# Patient Record
Sex: FEMALE | Race: WHITE | NOT HISPANIC OR LATINO
[De-identification: names, ages, dates, MRNs, and addresses within clinical notes are randomized per-mention and may not be internally consistent; named-entity substitution may affect disease eponyms.]

---

## 2017-03-17 ENCOUNTER — APPOINTMENT (OUTPATIENT)
Dept: OBGYN | Facility: CLINIC | Age: 47
End: 2017-03-17

## 2017-03-17 VITALS
WEIGHT: 143 LBS | DIASTOLIC BLOOD PRESSURE: 80 MMHG | HEIGHT: 59 IN | BODY MASS INDEX: 28.83 KG/M2 | SYSTOLIC BLOOD PRESSURE: 120 MMHG

## 2017-03-17 DIAGNOSIS — Z12.4 ENCOUNTER FOR GYNECOLOGICAL EXAMINATION (GENERAL) (ROUTINE) W/OUT ABNORMAL FINDINGS: ICD-10-CM

## 2017-03-17 DIAGNOSIS — Z01.419 ENCOUNTER FOR GYNECOLOGICAL EXAMINATION (GENERAL) (ROUTINE) W/OUT ABNORMAL FINDINGS: ICD-10-CM

## 2017-03-21 ENCOUNTER — LABORATORY RESULT (OUTPATIENT)
Age: 47
End: 2017-03-21

## 2017-03-21 LAB
C TRACH RRNA SPEC QL NAA+PROBE: NORMAL
HPV HIGH+LOW RISK DNA PNL CVX: NEGATIVE
N GONORRHOEA RRNA SPEC QL NAA+PROBE: NORMAL
SOURCE TP AMPLIFICATION: NORMAL

## 2017-03-23 ENCOUNTER — APPOINTMENT (OUTPATIENT)
Dept: MAMMOGRAPHY | Facility: CLINIC | Age: 47
End: 2017-03-23

## 2017-03-23 ENCOUNTER — FORM ENCOUNTER (OUTPATIENT)
Age: 47
End: 2017-03-23

## 2017-03-23 LAB — CYTOLOGY CVX/VAG DOC THIN PREP: NORMAL

## 2017-03-24 ENCOUNTER — APPOINTMENT (OUTPATIENT)
Dept: MAMMOGRAPHY | Facility: CLINIC | Age: 47
End: 2017-03-24

## 2017-03-24 ENCOUNTER — OUTPATIENT (OUTPATIENT)
Dept: OUTPATIENT SERVICES | Facility: HOSPITAL | Age: 47
LOS: 1 days | End: 2017-03-24
Payer: COMMERCIAL

## 2017-03-24 DIAGNOSIS — Z00.8 ENCOUNTER FOR OTHER GENERAL EXAMINATION: ICD-10-CM

## 2017-03-24 PROCEDURE — 77063 BREAST TOMOSYNTHESIS BI: CPT

## 2017-03-24 PROCEDURE — 77067 SCR MAMMO BI INCL CAD: CPT

## 2017-03-31 ENCOUNTER — APPOINTMENT (OUTPATIENT)
Dept: OBGYN | Facility: CLINIC | Age: 47
End: 2017-03-31

## 2017-05-03 ENCOUNTER — APPOINTMENT (OUTPATIENT)
Dept: OBGYN | Facility: CLINIC | Age: 47
End: 2017-05-03

## 2017-05-03 VITALS
BODY MASS INDEX: 27.42 KG/M2 | HEART RATE: 97 BPM | DIASTOLIC BLOOD PRESSURE: 86 MMHG | HEIGHT: 59 IN | WEIGHT: 136 LBS | SYSTOLIC BLOOD PRESSURE: 133 MMHG

## 2017-05-03 LAB
HCG UR QL: NEGATIVE
QUALITY CONTROL: YES

## 2017-05-10 LAB — CORE LAB BIOPSY: NORMAL

## 2017-05-15 ENCOUNTER — APPOINTMENT (OUTPATIENT)
Dept: OBGYN | Facility: CLINIC | Age: 47
End: 2017-05-15

## 2017-05-15 VITALS
HEIGHT: 59 IN | DIASTOLIC BLOOD PRESSURE: 77 MMHG | SYSTOLIC BLOOD PRESSURE: 125 MMHG | BODY MASS INDEX: 27.42 KG/M2 | WEIGHT: 136 LBS

## 2017-12-11 ENCOUNTER — APPOINTMENT (OUTPATIENT)
Dept: OBGYN | Facility: CLINIC | Age: 47
End: 2017-12-11
Payer: COMMERCIAL

## 2017-12-11 VITALS
SYSTOLIC BLOOD PRESSURE: 122 MMHG | BODY MASS INDEX: 30.64 KG/M2 | DIASTOLIC BLOOD PRESSURE: 78 MMHG | WEIGHT: 152 LBS | HEIGHT: 59 IN

## 2017-12-11 DIAGNOSIS — Z86.19 PERSONAL HISTORY OF OTHER INFECTIOUS AND PARASITIC DISEASES: ICD-10-CM

## 2017-12-11 DIAGNOSIS — Z86.59 PERSONAL HISTORY OF OTHER MENTAL AND BEHAVIORAL DISORDERS: ICD-10-CM

## 2017-12-11 DIAGNOSIS — Z86.79 PERSONAL HISTORY OF OTHER DISEASES OF THE CIRCULATORY SYSTEM: ICD-10-CM

## 2017-12-11 PROCEDURE — 99214 OFFICE O/P EST MOD 30 MIN: CPT

## 2017-12-11 RX ORDER — BUPROPION HYDROCHLORIDE 150 MG/1
150 TABLET, EXTENDED RELEASE ORAL
Qty: 30 | Refills: 0 | Status: COMPLETED | COMMUNITY
Start: 2017-02-14

## 2017-12-11 RX ORDER — VALSARTAN AND HYDROCHLOROTHIAZIDE 80; 12.5 MG/1; MG/1
80-12.5 TABLET, FILM COATED ORAL
Qty: 90 | Refills: 0 | Status: ACTIVE | COMMUNITY
Start: 2016-08-22

## 2017-12-11 RX ORDER — CLOTRIMAZOLE AND BETAMETHASONE DIPROPIONATE 10; .5 MG/G; MG/G
1-0.05 CREAM TOPICAL TWICE DAILY
Qty: 1 | Refills: 2 | Status: ACTIVE | COMMUNITY
Start: 2017-12-11 | End: 1900-01-01

## 2017-12-11 RX ORDER — METOPROLOL SUCCINATE 25 MG/1
25 TABLET, EXTENDED RELEASE ORAL
Qty: 90 | Refills: 0 | Status: ACTIVE | COMMUNITY
Start: 2016-10-06

## 2017-12-11 RX ORDER — FLUTICASONE PROPIONATE 50 UG/1
50 SPRAY, METERED NASAL
Qty: 16 | Refills: 0 | Status: COMPLETED | COMMUNITY
Start: 2016-12-13

## 2017-12-11 RX ORDER — KETOCONAZOLE 20 MG/G
2 CREAM TOPICAL
Qty: 60 | Refills: 0 | Status: COMPLETED | COMMUNITY
Start: 2017-03-06

## 2017-12-11 RX ORDER — FLUCONAZOLE 150 MG/1
150 TABLET ORAL
Qty: 2 | Refills: 2 | Status: ACTIVE | COMMUNITY
Start: 2017-12-11 | End: 1900-01-01

## 2017-12-11 RX ORDER — BUPROPION HYDROCHLORIDE 300 MG/1
300 TABLET, EXTENDED RELEASE ORAL
Refills: 0 | Status: ACTIVE | COMMUNITY

## 2017-12-11 RX ORDER — OMEPRAZOLE 20 MG/1
20 CAPSULE, DELAYED RELEASE ORAL
Qty: 30 | Refills: 0 | Status: COMPLETED | COMMUNITY
Start: 2017-01-03

## 2017-12-11 RX ORDER — ARIPIPRAZOLE 5 MG/1
5 TABLET ORAL
Qty: 30 | Refills: 0 | Status: COMPLETED | COMMUNITY
Start: 2017-03-03

## 2017-12-11 RX ORDER — AMOXICILLIN AND CLAVULANATE POTASSIUM 875; 125 MG/1; MG/1
875-125 TABLET, COATED ORAL
Qty: 20 | Refills: 0 | Status: COMPLETED | COMMUNITY
Start: 2016-12-13

## 2017-12-14 LAB — BACTERIA GENITAL AEROBE CULT: ABNORMAL

## 2020-12-15 PROBLEM — Z86.19 HISTORY OF CANDIDIASIS OF VAGINA: Status: RESOLVED | Noted: 2017-12-11 | Resolved: 2020-12-15

## 2020-12-15 PROBLEM — Z01.419 ENCOUNTER FOR GYNECOLOGICAL EXAMINATION WITH PAPANICOLAOU SMEAR OF CERVIX: Status: RESOLVED | Noted: 2017-03-17 | Resolved: 2020-12-15

## 2022-04-21 ENCOUNTER — OFFICE VISIT (OUTPATIENT)
Dept: FAMILY MEDICINE CLINIC | Facility: CLINIC | Age: 52
End: 2022-04-21
Payer: COMMERCIAL

## 2022-04-21 VITALS
DIASTOLIC BLOOD PRESSURE: 83 MMHG | HEART RATE: 83 BPM | WEIGHT: 165 LBS | HEIGHT: 60 IN | BODY MASS INDEX: 32.39 KG/M2 | SYSTOLIC BLOOD PRESSURE: 122 MMHG

## 2022-04-21 DIAGNOSIS — R63.5 WEIGHT GAIN: ICD-10-CM

## 2022-04-21 DIAGNOSIS — K92.1 BLOOD IN STOOL: Primary | ICD-10-CM

## 2022-04-21 PROCEDURE — 99203 OFFICE O/P NEW LOW 30 MIN: CPT | Performed by: PHYSICIAN ASSISTANT

## 2022-04-21 PROCEDURE — 82272 OCCULT BLD FECES 1-3 TESTS: CPT | Performed by: PHYSICIAN ASSISTANT

## 2022-04-21 PROCEDURE — 3074F SYST BP LT 130 MM HG: CPT | Performed by: PHYSICIAN ASSISTANT

## 2022-04-21 PROCEDURE — 3079F DIAST BP 80-89 MM HG: CPT | Performed by: PHYSICIAN ASSISTANT

## 2022-04-21 PROCEDURE — 3008F BODY MASS INDEX DOCD: CPT | Performed by: PHYSICIAN ASSISTANT

## 2022-04-21 RX ORDER — LOSARTAN POTASSIUM AND HYDROCHLOROTHIAZIDE 12.5; 5 MG/1; MG/1
1 TABLET ORAL DAILY
COMMUNITY
Start: 2020-05-04

## 2022-04-21 RX ORDER — ACAMPROSATE CALCIUM 333 MG/1
2 TABLET, DELAYED RELEASE ORAL AS DIRECTED
COMMUNITY
End: 2022-04-23 | Stop reason: ALTCHOICE

## 2022-04-21 RX ORDER — ERGOCALCIFEROL (VITAMIN D2) 1250 MCG
1.25 CAPSULE ORAL WEEKLY
COMMUNITY
Start: 2019-12-02 | End: 2022-04-23 | Stop reason: ALTCHOICE

## 2022-04-21 RX ORDER — ASCORBIC ACID 500 MG
500 TABLET ORAL
COMMUNITY
End: 2022-04-23 | Stop reason: ALTCHOICE

## 2022-04-22 LAB
OCCULT BLOOD, STOOL 1: POSITIVE
PERFORMANCE MONITORS CORRECT (YES/NO): YES YES/NO
TEST CARD LOT #: NORMAL NUMERIC

## 2022-04-23 ENCOUNTER — LAB ENCOUNTER (OUTPATIENT)
Dept: LAB | Age: 52
End: 2022-04-23
Attending: PHYSICIAN ASSISTANT
Payer: COMMERCIAL

## 2022-04-23 ENCOUNTER — OFFICE VISIT (OUTPATIENT)
Dept: FAMILY MEDICINE CLINIC | Facility: CLINIC | Age: 52
End: 2022-04-23
Payer: COMMERCIAL

## 2022-04-23 VITALS
HEART RATE: 83 BPM | SYSTOLIC BLOOD PRESSURE: 124 MMHG | HEIGHT: 60 IN | WEIGHT: 162 LBS | DIASTOLIC BLOOD PRESSURE: 88 MMHG | BODY MASS INDEX: 31.8 KG/M2

## 2022-04-23 DIAGNOSIS — Z00.00 ROUTINE GENERAL MEDICAL EXAMINATION AT A HEALTH CARE FACILITY: ICD-10-CM

## 2022-04-23 DIAGNOSIS — K92.1 BLOOD IN STOOL: ICD-10-CM

## 2022-04-23 DIAGNOSIS — E55.9 VITAMIN D DEFICIENCY: ICD-10-CM

## 2022-04-23 DIAGNOSIS — Z98.84 HISTORY OF GASTRIC BYPASS: ICD-10-CM

## 2022-04-23 DIAGNOSIS — Z00.00 ROUTINE GENERAL MEDICAL EXAMINATION AT A HEALTH CARE FACILITY: Primary | ICD-10-CM

## 2022-04-23 LAB
ALBUMIN SERPL-MCNC: 3.8 G/DL (ref 3.4–5)
ALBUMIN/GLOB SERPL: 1.2 {RATIO} (ref 1–2)
ALP LIVER SERPL-CCNC: 90 U/L
ALT SERPL-CCNC: 32 U/L
ANION GAP SERPL CALC-SCNC: 6 MMOL/L (ref 0–18)
AST SERPL-CCNC: 29 U/L (ref 15–37)
BASOPHILS # BLD AUTO: 0.05 X10(3) UL (ref 0–0.2)
BASOPHILS NFR BLD AUTO: 1 %
BILIRUB SERPL-MCNC: 0.4 MG/DL (ref 0.1–2)
BUN BLD-MCNC: 21 MG/DL (ref 7–18)
BUN/CREAT SERPL: 28.8 (ref 10–20)
CALCIUM BLD-MCNC: 9.7 MG/DL (ref 8.5–10.1)
CHLORIDE SERPL-SCNC: 102 MMOL/L (ref 98–112)
CHOLEST SERPL-MCNC: 258 MG/DL (ref ?–200)
CO2 SERPL-SCNC: 29 MMOL/L (ref 21–32)
CREAT BLD-MCNC: 0.73 MG/DL
DEPRECATED HBV CORE AB SER IA-ACNC: 29.2 NG/ML
DEPRECATED RDW RBC AUTO: 41.7 FL (ref 35.1–46.3)
EOSINOPHIL # BLD AUTO: 0.18 X10(3) UL (ref 0–0.7)
EOSINOPHIL NFR BLD AUTO: 3.5 %
ERYTHROCYTE [DISTWIDTH] IN BLOOD BY AUTOMATED COUNT: 13 % (ref 11–15)
EST. AVERAGE GLUCOSE BLD GHB EST-MCNC: 117 MG/DL (ref 68–126)
FASTING PATIENT LIPID ANSWER: YES
FASTING STATUS PATIENT QL REPORTED: YES
GLOBULIN PLAS-MCNC: 3.2 G/DL (ref 2.8–4.4)
GLUCOSE BLD-MCNC: 78 MG/DL (ref 70–99)
HBA1C MFR BLD: 5.7 % (ref ?–5.7)
HCT VFR BLD AUTO: 42.7 %
HDLC SERPL-MCNC: 88 MG/DL (ref 40–59)
HGB BLD-MCNC: 13.9 G/DL
IMM GRANULOCYTES # BLD AUTO: 0.01 X10(3) UL (ref 0–1)
IMM GRANULOCYTES NFR BLD: 0.2 %
IRON SATN MFR SERPL: 34 %
IRON SERPL-MCNC: 153 UG/DL
LDLC SERPL CALC-MCNC: 153 MG/DL (ref ?–100)
LYMPHOCYTES # BLD AUTO: 1.78 X10(3) UL (ref 1–4)
LYMPHOCYTES NFR BLD AUTO: 34.7 %
MCH RBC QN AUTO: 28.5 PG (ref 26–34)
MCHC RBC AUTO-ENTMCNC: 32.6 G/DL (ref 31–37)
MCV RBC AUTO: 87.7 FL
MONOCYTES # BLD AUTO: 0.36 X10(3) UL (ref 0.1–1)
MONOCYTES NFR BLD AUTO: 7 %
NEUTROPHILS # BLD AUTO: 2.75 X10 (3) UL (ref 1.5–7.7)
NEUTROPHILS # BLD AUTO: 2.75 X10(3) UL (ref 1.5–7.7)
NEUTROPHILS NFR BLD AUTO: 53.6 %
NONHDLC SERPL-MCNC: 170 MG/DL (ref ?–130)
OSMOLALITY SERPL CALC.SUM OF ELEC: 286 MOSM/KG (ref 275–295)
PLATELET # BLD AUTO: 311 10(3)UL (ref 150–450)
POTASSIUM SERPL-SCNC: 4.4 MMOL/L (ref 3.5–5.1)
PROT SERPL-MCNC: 7 G/DL (ref 6.4–8.2)
RBC # BLD AUTO: 4.87 X10(6)UL
SODIUM SERPL-SCNC: 137 MMOL/L (ref 136–145)
TIBC SERPL-MCNC: 446 UG/DL (ref 240–450)
TRANSFERRIN SERPL-MCNC: 299 MG/DL (ref 200–360)
TRIGL SERPL-MCNC: 101 MG/DL (ref 30–149)
TSI SER-ACNC: 0.72 MIU/ML (ref 0.36–3.74)
VIT B12 SERPL-MCNC: 992 PG/ML (ref 193–986)
VIT D+METAB SERPL-MCNC: 42.7 NG/ML (ref 30–100)
VLDLC SERPL CALC-MCNC: 19 MG/DL (ref 0–30)
WBC # BLD AUTO: 5.1 X10(3) UL (ref 4–11)

## 2022-04-23 PROCEDURE — 99396 PREV VISIT EST AGE 40-64: CPT | Performed by: PHYSICIAN ASSISTANT

## 2022-04-23 PROCEDURE — 90750 HZV VACC RECOMBINANT IM: CPT | Performed by: PHYSICIAN ASSISTANT

## 2022-04-23 PROCEDURE — 36415 COLL VENOUS BLD VENIPUNCTURE: CPT

## 2022-04-23 PROCEDURE — 90472 IMMUNIZATION ADMIN EACH ADD: CPT | Performed by: PHYSICIAN ASSISTANT

## 2022-04-23 PROCEDURE — 82607 VITAMIN B-12: CPT

## 2022-04-23 PROCEDURE — 80053 COMPREHEN METABOLIC PANEL: CPT

## 2022-04-23 PROCEDURE — 3074F SYST BP LT 130 MM HG: CPT | Performed by: PHYSICIAN ASSISTANT

## 2022-04-23 PROCEDURE — 84466 ASSAY OF TRANSFERRIN: CPT

## 2022-04-23 PROCEDURE — 80061 LIPID PANEL: CPT

## 2022-04-23 PROCEDURE — 3079F DIAST BP 80-89 MM HG: CPT | Performed by: PHYSICIAN ASSISTANT

## 2022-04-23 PROCEDURE — 82306 VITAMIN D 25 HYDROXY: CPT

## 2022-04-23 PROCEDURE — 82728 ASSAY OF FERRITIN: CPT

## 2022-04-23 PROCEDURE — 90732 PPSV23 VACC 2 YRS+ SUBQ/IM: CPT | Performed by: PHYSICIAN ASSISTANT

## 2022-04-23 PROCEDURE — 3008F BODY MASS INDEX DOCD: CPT | Performed by: PHYSICIAN ASSISTANT

## 2022-04-23 PROCEDURE — 90471 IMMUNIZATION ADMIN: CPT | Performed by: PHYSICIAN ASSISTANT

## 2022-04-23 PROCEDURE — 83540 ASSAY OF IRON: CPT

## 2022-04-23 PROCEDURE — 85025 COMPLETE CBC W/AUTO DIFF WBC: CPT

## 2022-04-23 PROCEDURE — 83036 HEMOGLOBIN GLYCOSYLATED A1C: CPT

## 2022-04-23 PROCEDURE — 84443 ASSAY THYROID STIM HORMONE: CPT

## 2022-04-27 ENCOUNTER — TELEPHONE (OUTPATIENT)
Dept: GASTROENTEROLOGY | Facility: CLINIC | Age: 52
End: 2022-04-27

## 2022-04-27 ENCOUNTER — LAB ENCOUNTER (OUTPATIENT)
Dept: LAB | Age: 52
End: 2022-04-27
Attending: NURSE PRACTITIONER
Payer: COMMERCIAL

## 2022-04-27 ENCOUNTER — OFFICE VISIT (OUTPATIENT)
Dept: GASTROENTEROLOGY | Facility: CLINIC | Age: 52
End: 2022-04-27
Payer: COMMERCIAL

## 2022-04-27 VITALS
SYSTOLIC BLOOD PRESSURE: 119 MMHG | WEIGHT: 162 LBS | HEIGHT: 60 IN | HEART RATE: 76 BPM | DIASTOLIC BLOOD PRESSURE: 84 MMHG | BODY MASS INDEX: 31.8 KG/M2

## 2022-04-27 DIAGNOSIS — K62.5 BRBPR (BRIGHT RED BLOOD PER RECTUM): Primary | ICD-10-CM

## 2022-04-27 DIAGNOSIS — K62.5 BRBPR (BRIGHT RED BLOOD PER RECTUM): ICD-10-CM

## 2022-04-27 DIAGNOSIS — Z83.79 FAMILY HISTORY OF CROHN'S DISEASE: ICD-10-CM

## 2022-04-27 LAB — CRP SERPL-MCNC: 0.46 MG/DL (ref ?–0.3)

## 2022-04-27 PROCEDURE — 86140 C-REACTIVE PROTEIN: CPT

## 2022-04-27 PROCEDURE — 99244 OFF/OP CNSLTJ NEW/EST MOD 40: CPT | Performed by: NURSE PRACTITIONER

## 2022-04-27 PROCEDURE — 3008F BODY MASS INDEX DOCD: CPT | Performed by: NURSE PRACTITIONER

## 2022-04-27 PROCEDURE — 36415 COLL VENOUS BLD VENIPUNCTURE: CPT

## 2022-04-27 PROCEDURE — 3074F SYST BP LT 130 MM HG: CPT | Performed by: NURSE PRACTITIONER

## 2022-04-27 PROCEDURE — 3079F DIAST BP 80-89 MM HG: CPT | Performed by: NURSE PRACTITIONER

## 2022-04-27 RX ORDER — LOSARTAN POTASSIUM 50 MG/1
TABLET ORAL DAILY
COMMUNITY

## 2022-04-27 RX ORDER — POLYETHYLENE GLYCOL 3350, SODIUM CHLORIDE, SODIUM BICARBONATE, POTASSIUM CHLORIDE 420; 11.2; 5.72; 1.48 G/4L; G/4L; G/4L; G/4L
4000 POWDER, FOR SOLUTION ORAL ONCE
Qty: 4000 ML | Refills: 0 | Status: SHIPPED | OUTPATIENT
Start: 2022-04-27 | End: 2022-04-27

## 2022-04-27 NOTE — TELEPHONE ENCOUNTER
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     Thank you

## 2022-04-27 NOTE — TELEPHONE ENCOUNTER
Patient requesting bowel prep be sent to pharmacy. Orders pended below, please sign if appropriate, thank you.

## 2022-04-27 NOTE — PATIENT INSTRUCTIONS
-labs   -er if condition decline  1. Schedule colonoscopy with SHANNAN w/ nat Trammell, or di [Diagnosis: brbpr, fhx crohns]    2.  bowel prep from pharmacy (split trilyte)    3. Hold losartan am of procedure    4. Read all bowel prep instructions carefully    5. AVOID seeds, nuts, popcorn, raw fruits and vegetables (cooked is okay) for 2-3 days before procedure    6. You will need to be tested for COVID within 72 hours of your procedure. You will be contacted with instructions on how to do this.       >>>Please note: if you were prescribed Suprep for the bowel prep and it is too expensive or not covered by insurance, it is okay to substitute Trilyte (or any similar generic prep). This can be done by notifying the pharmacy or calling our office.

## 2022-04-27 NOTE — TELEPHONE ENCOUNTER
Pt called in to get the bowel prep medication for upcoming procedure.  Pt is currently at the pharmacy please send as soon as possible thank you and please call pt once medication has been sent

## 2022-04-27 NOTE — TELEPHONE ENCOUNTER
Scheduled for:  Colonoscopy [address]  Provider Name: Dr Ata Turpin  Date: Elias Rogelio to Sat 5/21/2022  Location: [facility]  Sedation: MAC   Time:  8 am   Prep: split trilyte   Meds/Allergies Reconciled?:  NKDA  Diagnosis with codes: BRBPR K62.5, Fhx Crohn's Z83.79  Was patient informed to call insurance with codes (Y/N): Yes   Referral sent?:  Yes  [facility] or [facility]  notified?:  I sent an electronic request to Endo Scheduling and received a confirmation today. Medication Orders: Pt is aware to NOT take iron pills, herbal meds and diet supplements for 7 days before exam. Also to NOT take any form of alcohol, recreational drugs and any forms of ED meds 24 hours before exam.      Misc Orders:  Patient was informed that they will need a COVID 19 test prior to their procedure. Patient verbally understood & will await a phone call from [facility] to schedule. Further instructions given by staff:    Instructions given and pt verbalized understanding

## 2022-04-27 NOTE — TELEPHONE ENCOUNTER
I called  Patient and was able to move her procedure to an earlier date. Patient agreed to [address] [place] [address] [address] instructions    Scheduled for:  Colonoscopy [address]  Provider Name: Dr Kirstie Mcleod  Date: Azam Burr to Sat 4/30/2022      From: 5/21/2022  Location: [facility]  Sedation: MAC   Time: Moved to 10 am                    From: 8 am   Prep: split trilyte   Meds/Allergies Reconciled?:  NKDA  Diagnosis with codes: BRBPR K62.5, Fhx Crohn's Z83.79  Was patient informed to call insurance with codes (Y/N): Yes   Referral sent?:  Yes  [facility] or [address] [place] [address] notified?:  I sent an electronic change case request to Endo Scheduling and received a confirmation today.

## 2022-04-28 RX ORDER — SODIUM CHLORIDE, SODIUM LACTATE, POTASSIUM CHLORIDE, CALCIUM CHLORIDE 600; 310; 30; 20 MG/100ML; MG/100ML; MG/100ML; MG/100ML
INJECTION, SOLUTION INTRAVENOUS CONTINUOUS
OUTPATIENT
Start: 2022-04-28

## 2022-04-30 ENCOUNTER — HOSPITAL ENCOUNTER (OUTPATIENT)
Facility: HOSPITAL | Age: 52
Setting detail: HOSPITAL OUTPATIENT SURGERY
Discharge: HOME | End: 2022-04-30
Attending: INTERNAL MEDICINE | Admitting: INTERNAL MEDICINE
Payer: COMMERCIAL

## 2022-04-30 ENCOUNTER — ANESTHESIA EVENT (OUTPATIENT)
Dept: ENDOSCOPY | Facility: HOSPITAL | Age: 52
End: 2022-04-30
Payer: COMMERCIAL

## 2022-04-30 ENCOUNTER — ANESTHESIA (OUTPATIENT)
Dept: ENDOSCOPY | Facility: HOSPITAL | Age: 52
End: 2022-04-30
Payer: COMMERCIAL

## 2022-04-30 VITALS
TEMPERATURE: 98 F | HEART RATE: 59 BPM | OXYGEN SATURATION: 98 % | DIASTOLIC BLOOD PRESSURE: 71 MMHG | BODY MASS INDEX: 31.41 KG/M2 | WEIGHT: 160 LBS | RESPIRATION RATE: 17 BRPM | HEIGHT: 60 IN | SYSTOLIC BLOOD PRESSURE: 98 MMHG

## 2022-04-30 DIAGNOSIS — Z83.79 FAMILY HISTORY OF CROHN'S DISEASE: ICD-10-CM

## 2022-04-30 DIAGNOSIS — K62.5 BRIGHT RED BLOOD PER RECTUM: ICD-10-CM

## 2022-04-30 PROCEDURE — 0DBB8ZX EXCISION OF ILEUM, VIA NATURAL OR ARTIFICIAL OPENING ENDOSCOPIC, DIAGNOSTIC: ICD-10-PCS | Performed by: INTERNAL MEDICINE

## 2022-04-30 PROCEDURE — 45378 DIAGNOSTIC COLONOSCOPY: CPT | Performed by: INTERNAL MEDICINE

## 2022-04-30 PROCEDURE — 0DBN8ZX EXCISION OF SIGMOID COLON, VIA NATURAL OR ARTIFICIAL OPENING ENDOSCOPIC, DIAGNOSTIC: ICD-10-PCS | Performed by: INTERNAL MEDICINE

## 2022-04-30 RX ORDER — SODIUM CHLORIDE, SODIUM LACTATE, POTASSIUM CHLORIDE, CALCIUM CHLORIDE 600; 310; 30; 20 MG/100ML; MG/100ML; MG/100ML; MG/100ML
INJECTION, SOLUTION INTRAVENOUS CONTINUOUS
OUTPATIENT
Start: 2022-04-30

## 2022-04-30 RX ORDER — NALOXONE HYDROCHLORIDE 0.4 MG/ML
80 INJECTION, SOLUTION INTRAMUSCULAR; INTRAVENOUS; SUBCUTANEOUS AS NEEDED
OUTPATIENT
Start: 2022-04-30 | End: 2022-04-30

## 2022-04-30 RX ORDER — LIDOCAINE HYDROCHLORIDE 10 MG/ML
INJECTION, SOLUTION EPIDURAL; INFILTRATION; INTRACAUDAL; PERINEURAL AS NEEDED
Status: DISCONTINUED | OUTPATIENT
Start: 2022-04-30 | End: 2022-04-30 | Stop reason: SURG

## 2022-04-30 RX ORDER — SODIUM CHLORIDE, SODIUM LACTATE, POTASSIUM CHLORIDE, CALCIUM CHLORIDE 600; 310; 30; 20 MG/100ML; MG/100ML; MG/100ML; MG/100ML
INJECTION, SOLUTION INTRAVENOUS CONTINUOUS PRN
Status: DISCONTINUED | OUTPATIENT
Start: 2022-04-30 | End: 2022-04-30 | Stop reason: SURG

## 2022-04-30 RX ADMIN — LIDOCAINE HYDROCHLORIDE 50 MG: 10 INJECTION, SOLUTION EPIDURAL; INFILTRATION; INTRACAUDAL; PERINEURAL at 09:48:00

## 2022-04-30 RX ADMIN — SODIUM CHLORIDE, SODIUM LACTATE, POTASSIUM CHLORIDE, CALCIUM CHLORIDE: 600; 310; 30; 20 INJECTION, SOLUTION INTRAVENOUS at 09:46:00

## 2022-05-09 ENCOUNTER — TELEPHONE (OUTPATIENT)
Dept: GASTROENTEROLOGY | Facility: CLINIC | Age: 52
End: 2022-05-09

## 2022-05-09 NOTE — TELEPHONE ENCOUNTER
Left VM for patient re: CLN path: neg for microscopic colitis. Likely prep related changes.       GI staff: please place recall in for colonoscopy in 10 years

## 2022-05-19 ENCOUNTER — OFFICE VISIT (OUTPATIENT)
Dept: OBGYN CLINIC | Facility: CLINIC | Age: 52
End: 2022-05-19
Payer: COMMERCIAL

## 2022-05-19 VITALS
WEIGHT: 158 LBS | BODY MASS INDEX: 32.28 KG/M2 | DIASTOLIC BLOOD PRESSURE: 78 MMHG | HEART RATE: 73 BPM | HEIGHT: 58.5 IN | SYSTOLIC BLOOD PRESSURE: 112 MMHG

## 2022-05-19 DIAGNOSIS — Z12.31 SCREENING MAMMOGRAM, ENCOUNTER FOR: ICD-10-CM

## 2022-05-19 DIAGNOSIS — Z01.419 WELL WOMAN EXAM: Primary | ICD-10-CM

## 2022-05-19 DIAGNOSIS — Z12.4 CERVICAL CANCER SCREENING: ICD-10-CM

## 2022-05-19 PROCEDURE — 99386 PREV VISIT NEW AGE 40-64: CPT | Performed by: OBSTETRICS & GYNECOLOGY

## 2022-05-19 PROCEDURE — 3078F DIAST BP <80 MM HG: CPT | Performed by: OBSTETRICS & GYNECOLOGY

## 2022-05-19 PROCEDURE — 3074F SYST BP LT 130 MM HG: CPT | Performed by: OBSTETRICS & GYNECOLOGY

## 2022-05-19 PROCEDURE — 3008F BODY MASS INDEX DOCD: CPT | Performed by: OBSTETRICS & GYNECOLOGY

## 2022-05-20 LAB — HPV I/H RISK 1 DNA SPEC QL NAA+PROBE: POSITIVE

## 2022-05-27 LAB
HPV16 DNA CVX QL PROBE+SIG AMP: POSITIVE
HPV18 DNA CVX QL PROBE+SIG AMP: NEGATIVE

## 2022-06-01 ENCOUNTER — TELEPHONE (OUTPATIENT)
Dept: ORTHOPEDICS CLINIC | Facility: CLINIC | Age: 52
End: 2022-06-01

## 2022-06-01 DIAGNOSIS — M25.552 LEFT HIP PAIN: Primary | ICD-10-CM

## 2022-06-01 NOTE — TELEPHONE ENCOUNTER
Patient coming in for Pain in left hip radiating down to mid shin. Patient has not had any imaging done. If imaging is required please place Rx. Thank you.     Future Appointments   Date Time Provider Vinny Mckay   6/2/2022  2:20 PM Aiden Petit EM[facility] [id number]   7/7/2022  9:20 AM STAR Love [address]   7/7/2022 11:00 AM Lanell Osler., DPM [facility] [place]   7/7/2022  4:00 PM EC LMB 2ND FLR RN FM ECLMBFM [facility] [place]   9/28/2022  4:00 PM Syl Starks MD [facility]

## 2022-06-02 ENCOUNTER — HOSPITAL ENCOUNTER (OUTPATIENT)
Dept: GENERAL RADIOLOGY | Age: 52
Discharge: HOME | End: 2022-06-02
Attending: PHYSICIAN ASSISTANT
Payer: COMMERCIAL

## 2022-06-02 ENCOUNTER — TELEPHONE (OUTPATIENT)
Dept: OBGYN CLINIC | Facility: CLINIC | Age: 52
End: 2022-06-02

## 2022-06-02 ENCOUNTER — OFFICE VISIT (OUTPATIENT)
Dept: ORTHOPEDICS CLINIC | Facility: CLINIC | Age: 52
End: 2022-06-02
Payer: COMMERCIAL

## 2022-06-02 VITALS — HEART RATE: 79 BPM | OXYGEN SATURATION: 98 %

## 2022-06-02 DIAGNOSIS — M76.899 HAMSTRING TENDINITIS: ICD-10-CM

## 2022-06-02 DIAGNOSIS — M25.852 HIP IMPINGEMENT SYNDROME, LEFT: Primary | ICD-10-CM

## 2022-06-02 DIAGNOSIS — M25.552 LEFT HIP PAIN: ICD-10-CM

## 2022-06-02 DIAGNOSIS — M24.552 LEFT HIP FLEXOR TIGHTNESS: ICD-10-CM

## 2022-06-02 PROCEDURE — 99213 OFFICE O/P EST LOW 20 MIN: CPT | Performed by: PHYSICIAN ASSISTANT

## 2022-06-02 PROCEDURE — 73502 X-RAY EXAM HIP UNI 2-3 VIEWS: CPT | Performed by: PHYSICIAN ASSISTANT

## 2022-06-02 RX ORDER — NAPROXEN 500 MG/1
500 TABLET ORAL
Qty: 1 TABLET | Refills: 0 | Status: SHIPPED | OUTPATIENT
Start: 2022-06-02 | End: 2022-06-02

## 2022-06-02 NOTE — TELEPHONE ENCOUNTER
Pt informed of results and recs and verbalized understanding. Assist pt with scheduling colpo on 6/15. Pt is postmenopausal, hcg is not needed.

## 2022-06-06 DIAGNOSIS — M25.852 HIP IMPINGEMENT SYNDROME, LEFT: Primary | ICD-10-CM

## 2022-06-06 RX ORDER — NAPROXEN 500 MG/1
500 TABLET ORAL 2 TIMES DAILY WITH MEALS
Qty: 60 TABLET | Refills: 1 | Status: SHIPPED | OUTPATIENT
Start: 2022-06-06

## 2022-06-11 ENCOUNTER — PATIENT MESSAGE (OUTPATIENT)
Dept: FAMILY MEDICINE CLINIC | Facility: CLINIC | Age: 52
End: 2022-06-11

## 2022-06-11 ENCOUNTER — HOSPITAL ENCOUNTER (OUTPATIENT)
Dept: MAMMOGRAPHY | Facility: HOSPITAL | Age: 52
Discharge: HOME | End: 2022-06-11
Attending: OBSTETRICS & GYNECOLOGY
Payer: COMMERCIAL

## 2022-06-11 DIAGNOSIS — Z12.31 SCREENING MAMMOGRAM, ENCOUNTER FOR: ICD-10-CM

## 2022-06-11 PROCEDURE — 77067 SCR MAMMO BI INCL CAD: CPT | Performed by: OBSTETRICS & GYNECOLOGY

## 2022-06-11 PROCEDURE — 77063 BREAST TOMOSYNTHESIS BI: CPT | Performed by: OBSTETRICS & GYNECOLOGY

## 2022-06-11 RX ORDER — LIDOCAINE 50 MG/G
1 PATCH TOPICAL EVERY 24 HOURS
Qty: 90 EACH | Refills: 1 | Status: SHIPPED | OUTPATIENT
Start: 2022-06-11

## 2022-06-12 NOTE — TELEPHONE ENCOUNTER
Min =see below,pended medication. From: Amanda Quinteros  To: Dileep Greene PA-C  Sent: 6/11/2022 10:58 AM CDT  Subject: medication    Hi! Hope all is well! I have shin splints from standing in the OR at work. I am wondering if you could send an Rx for 5% lidocaine patches 10cm x 14cm into the pharmacy. I had some leftovers from my doctor back in [place] but I'm out.  Thank you, Antoine Gallagher

## 2022-06-13 ENCOUNTER — TELEPHONE (OUTPATIENT)
Dept: INTERNAL MEDICINE CLINIC | Facility: CLINIC | Age: 52
End: 2022-06-13

## 2022-06-13 DIAGNOSIS — S86.899A MEDIAL TIBIAL STRESS SYNDROME, UNSPECIFIED LATERALITY, INITIAL ENCOUNTER: Primary | ICD-10-CM

## 2022-06-13 RX ORDER — LIDOCAINE 4 G/G
1 PATCH TOPICAL EVERY 24 HOURS
Qty: 12 PATCH | Refills: 0 | Status: SHIPPED | OUTPATIENT
Start: 2022-06-13

## 2022-06-15 ENCOUNTER — OFFICE VISIT (OUTPATIENT)
Dept: OBGYN CLINIC | Facility: CLINIC | Age: 52
End: 2022-06-15
Payer: COMMERCIAL

## 2022-06-15 VITALS
BODY MASS INDEX: 32 KG/M2 | SYSTOLIC BLOOD PRESSURE: 128 MMHG | HEART RATE: 78 BPM | DIASTOLIC BLOOD PRESSURE: 84 MMHG | WEIGHT: 158 LBS

## 2022-06-15 DIAGNOSIS — R87.810 CERVICAL HIGH RISK HUMAN PAPILLOMAVIRUS (HPV) DNA TEST POSITIVE: Primary | ICD-10-CM

## 2022-06-15 PROCEDURE — 3079F DIAST BP 80-89 MM HG: CPT | Performed by: OBSTETRICS & GYNECOLOGY

## 2022-06-15 PROCEDURE — 57456 ENDOCERV CURETTAGE W/SCOPE: CPT | Performed by: OBSTETRICS & GYNECOLOGY

## 2022-06-15 PROCEDURE — 3074F SYST BP LT 130 MM HG: CPT | Performed by: OBSTETRICS & GYNECOLOGY

## 2022-06-15 NOTE — PROCEDURES
Colpo with Endocervical Curettage    Consent signed. Procedure discussed with patient in detail including indication, risk, benefits, alternatives and complications. Indication: HRHPV 16+    Procedure: The patient was placed in the dorsal lithotomy position. Nineveh speculum was placed in the vagina. Cervix prepped with: Acetic acid  Lugol  Under colposcopic examination the transition zone was retracted. Endocervix was curetted using a Kervorkian curette. Monsel's solution was applied. Specimen sent to pathology. Patient tolerated procedure well. Discharge instructions of pelvic rest & no swimming x one week.

## 2022-06-17 ENCOUNTER — TELEPHONE (OUTPATIENT)
Dept: OBGYN CLINIC | Facility: CLINIC | Age: 52
End: 2022-06-17

## 2022-06-17 NOTE — TELEPHONE ENCOUNTER
Pt informed the ECC results showed mild dysplasia. Pt informed this is a mild cellular change that can be caused by the hpv virus. Pt advised many times the recs are a pap in 1 year, but [address] [place] [address] has not reviewed the results so he may have different recs. Pt asked that message be sent to [address] [place] [address] for recs and asked that a message be left on her voicemail if she does not answer when we call to discuss BRANDI's plan.

## 2022-07-07 ENCOUNTER — NURSE ONLY (OUTPATIENT)
Dept: FAMILY MEDICINE CLINIC | Facility: CLINIC | Age: 52
End: 2022-07-07
Payer: COMMERCIAL

## 2022-07-07 DIAGNOSIS — Z23 NEED FOR SHINGLES VACCINE: Primary | ICD-10-CM

## 2022-07-07 PROCEDURE — 90750 HZV VACC RECOMBINANT IM: CPT | Performed by: PHYSICIAN ASSISTANT

## 2022-07-07 PROCEDURE — 90471 IMMUNIZATION ADMIN: CPT | Performed by: PHYSICIAN ASSISTANT

## 2022-08-18 ENCOUNTER — TELEPHONE (OUTPATIENT)
Dept: FAMILY MEDICINE CLINIC | Facility: CLINIC | Age: 52
End: 2022-08-18

## 2022-08-20 ENCOUNTER — TELEPHONE (OUTPATIENT)
Dept: ORTHOPEDICS CLINIC | Facility: CLINIC | Age: 52
End: 2022-08-20

## 2022-08-20 NOTE — TELEPHONE ENCOUNTER
Patient is coming in for shin splints. Patient has not had any imaging done. Please place X-ray order accordingly. Patient has been notified to come in earlier prior to appointment. Thank you.     Future Appointments   Date Time Provider Vinny Mckay   8/30/2022  2:00 [id number]   8/30/2022  4:00 PM Orion Edison, DPM ECLMBPOD [facility] [place]   9/28/2022  4:00 PM Clive Santana MD [address] [place] [address]

## 2022-08-30 ENCOUNTER — HOSPITAL ENCOUNTER (OUTPATIENT)
Dept: GENERAL RADIOLOGY | Age: 52
Discharge: HOME | End: 2022-08-30
Attending: PODIATRIST
Payer: COMMERCIAL

## 2022-08-30 ENCOUNTER — OFFICE VISIT (OUTPATIENT)
Dept: PODIATRY CLINIC | Facility: CLINIC | Age: 52
End: 2022-08-30
Payer: COMMERCIAL

## 2022-08-30 DIAGNOSIS — M20.41 ACQUIRED HAMMERTOE OF RIGHT FOOT: ICD-10-CM

## 2022-08-30 DIAGNOSIS — M77.41 METATARSALGIA, RIGHT FOOT: ICD-10-CM

## 2022-08-30 DIAGNOSIS — M20.41 ACQUIRED HAMMERTOE OF RIGHT FOOT: Primary | ICD-10-CM

## 2022-08-30 DIAGNOSIS — M79.671 RIGHT FOOT PAIN: ICD-10-CM

## 2022-08-30 DIAGNOSIS — L84 FOOT CALLUS: ICD-10-CM

## 2022-08-30 PROCEDURE — 73630 X-RAY EXAM OF FOOT: CPT | Performed by: PODIATRIST

## 2022-08-30 PROCEDURE — 99203 OFFICE O/P NEW LOW 30 MIN: CPT | Performed by: PODIATRIST

## 2022-09-02 ENCOUNTER — HOSPITAL ENCOUNTER (OUTPATIENT)
Dept: GENERAL RADIOLOGY | Age: 52
Discharge: HOME | End: 2022-09-02
Attending: PHYSICIAN ASSISTANT
Payer: COMMERCIAL

## 2022-09-02 ENCOUNTER — OFFICE VISIT (OUTPATIENT)
Dept: ORTHOPEDICS CLINIC | Facility: CLINIC | Age: 52
End: 2022-09-02
Payer: COMMERCIAL

## 2022-09-02 DIAGNOSIS — M25.852 HIP IMPINGEMENT SYNDROME, LEFT: ICD-10-CM

## 2022-09-02 DIAGNOSIS — M89.8X6 PAIN OF LEFT FIBULA: ICD-10-CM

## 2022-09-02 DIAGNOSIS — M62.89 TENSOR FASCIA LATA SYNDROME: Primary | ICD-10-CM

## 2022-09-02 DIAGNOSIS — M76.899 HAMSTRING TENDINITIS: ICD-10-CM

## 2022-09-02 DIAGNOSIS — M24.552 LEFT HIP FLEXOR TIGHTNESS: ICD-10-CM

## 2022-09-02 PROCEDURE — 99215 OFFICE O/P EST HI 40 MIN: CPT | Performed by: PHYSICIAN ASSISTANT

## 2022-09-02 PROCEDURE — 99417 PROLNG OP E/M EACH 15 MIN: CPT | Performed by: PHYSICIAN ASSISTANT

## 2022-09-02 PROCEDURE — 73590 X-RAY EXAM OF LOWER LEG: CPT | Performed by: PHYSICIAN ASSISTANT

## 2022-09-13 ENCOUNTER — TELEPHONE (OUTPATIENT)
Dept: PHYSICAL THERAPY | Facility: HOSPITAL | Age: 52
End: 2022-09-13

## 2022-09-14 ENCOUNTER — TELEPHONE (OUTPATIENT)
Dept: PHYSICAL THERAPY | Facility: HOSPITAL | Age: 52
End: 2022-09-14

## 2022-09-14 ENCOUNTER — OFFICE VISIT (OUTPATIENT)
Dept: PHYSICAL THERAPY | Facility: HOSPITAL | Age: 52
End: 2022-09-14
Attending: PHYSICIAN ASSISTANT
Payer: COMMERCIAL

## 2022-09-16 PROCEDURE — 97162 PT EVAL MOD COMPLEX 30 MIN: CPT

## 2022-09-21 ENCOUNTER — APPOINTMENT (OUTPATIENT)
Dept: PHYSICAL THERAPY | Facility: HOSPITAL | Age: 52
End: 2022-09-21
Attending: PHYSICIAN ASSISTANT
Payer: COMMERCIAL

## 2022-09-22 ENCOUNTER — PATIENT MESSAGE (OUTPATIENT)
Dept: FAMILY MEDICINE CLINIC | Facility: CLINIC | Age: 52
End: 2022-09-22

## 2022-09-23 RX ORDER — METOPROLOL SUCCINATE 25 MG/1
25 TABLET, EXTENDED RELEASE ORAL DAILY
Qty: 90 TABLET | Refills: 1 | Status: SHIPPED | OUTPATIENT
Start: 2022-09-23

## 2022-09-23 NOTE — TELEPHONE ENCOUNTER
From: Adrian Cushing  To: Tanner Delgadillo PA-C  Sent: 9/22/2022 7:31 PM CDT  Subject: Rx refill    Hi,   I hope all is well. I need a refill on Rx metoprolol ER 25mg Tab ING  Qty 90  1 tab/day  Please send it to the [facility] DR RAQUEL LOPES on file on [address].   Thank you  Mirna Porter

## 2022-09-27 ENCOUNTER — OFFICE VISIT (OUTPATIENT)
Dept: PHYSICAL THERAPY | Facility: HOSPITAL | Age: 52
End: 2022-09-27
Attending: PHYSICIAN ASSISTANT
Payer: COMMERCIAL

## 2022-09-27 PROCEDURE — 97110 THERAPEUTIC EXERCISES: CPT

## 2022-09-27 PROCEDURE — 97140 MANUAL THERAPY 1/> REGIONS: CPT

## 2022-09-28 ENCOUNTER — OFFICE VISIT (OUTPATIENT)
Dept: SURGERY | Facility: CLINIC | Age: 52
End: 2022-09-28

## 2022-09-28 ENCOUNTER — LAB ENCOUNTER (OUTPATIENT)
Dept: LAB | Facility: HOSPITAL | Age: 52
End: 2022-09-28
Attending: INTERNAL MEDICINE
Payer: COMMERCIAL

## 2022-09-28 VITALS
BODY MASS INDEX: 34.16 KG/M2 | DIASTOLIC BLOOD PRESSURE: 81 MMHG | SYSTOLIC BLOOD PRESSURE: 123 MMHG | HEIGHT: 58.9 IN | OXYGEN SATURATION: 95 % | HEART RATE: 78 BPM | WEIGHT: 169.44 LBS

## 2022-09-28 DIAGNOSIS — E44.1 MILD PROTEIN-CALORIE MALNUTRITION (HCC): ICD-10-CM

## 2022-09-28 DIAGNOSIS — E66.9 OBESITY (BMI 30-39.9): ICD-10-CM

## 2022-09-28 DIAGNOSIS — R63.5 WEIGHT GAIN: ICD-10-CM

## 2022-09-28 DIAGNOSIS — I10 HTN (HYPERTENSION), BENIGN: ICD-10-CM

## 2022-09-28 DIAGNOSIS — R73.03 PRE-DIABETES: ICD-10-CM

## 2022-09-28 DIAGNOSIS — Z99.89 OSA ON CPAP: ICD-10-CM

## 2022-09-28 DIAGNOSIS — E78.5 DYSLIPIDEMIA: ICD-10-CM

## 2022-09-28 DIAGNOSIS — G47.33 OSA ON CPAP: ICD-10-CM

## 2022-09-28 DIAGNOSIS — R73.03 PRE-DIABETES: Primary | ICD-10-CM

## 2022-09-28 LAB — INSULIN SERPL-ACNC: 3.2 MU/L (ref 3–25)

## 2022-09-28 PROCEDURE — 3074F SYST BP LT 130 MM HG: CPT | Performed by: INTERNAL MEDICINE

## 2022-09-28 PROCEDURE — 83525 ASSAY OF INSULIN: CPT

## 2022-09-28 PROCEDURE — 99204 OFFICE O/P NEW MOD 45 MIN: CPT | Performed by: INTERNAL MEDICINE

## 2022-09-28 PROCEDURE — 93005 ELECTROCARDIOGRAM TRACING: CPT

## 2022-09-28 PROCEDURE — 93010 ELECTROCARDIOGRAM REPORT: CPT | Performed by: INTERNAL MEDICINE

## 2022-09-28 PROCEDURE — 82397 CHEMILUMINESCENT ASSAY: CPT

## 2022-09-28 PROCEDURE — 84425 ASSAY OF VITAMIN B-1: CPT

## 2022-09-28 PROCEDURE — 3079F DIAST BP 80-89 MM HG: CPT | Performed by: INTERNAL MEDICINE

## 2022-09-28 PROCEDURE — 3008F BODY MASS INDEX DOCD: CPT | Performed by: INTERNAL MEDICINE

## 2022-09-28 PROCEDURE — 36415 COLL VENOUS BLD VENIPUNCTURE: CPT

## 2022-09-28 RX ORDER — PHENTERMINE HYDROCHLORIDE 15 MG/1
15 CAPSULE ORAL EVERY MORNING
Qty: 30 CAPSULE | Refills: 2 | Status: SHIPPED | OUTPATIENT
Start: 2022-09-28

## 2022-10-04 ENCOUNTER — APPOINTMENT (OUTPATIENT)
Dept: PHYSICAL THERAPY | Facility: HOSPITAL | Age: 52
End: 2022-10-04
Attending: PHYSICIAN ASSISTANT
Payer: COMMERCIAL

## 2022-10-04 ENCOUNTER — TELEPHONE (OUTPATIENT)
Dept: PHYSICAL THERAPY | Facility: HOSPITAL | Age: 52
End: 2022-10-04

## 2022-10-06 ENCOUNTER — OFFICE VISIT (OUTPATIENT)
Dept: OTHER | Facility: HOSPITAL | Age: 52
End: 2022-10-06
Attending: EMERGENCY MEDICINE

## 2022-10-06 ENCOUNTER — OFFICE VISIT (OUTPATIENT)
Dept: PHYSICAL THERAPY | Facility: HOSPITAL | Age: 52
End: 2022-10-06
Attending: PHYSICIAN ASSISTANT
Payer: COMMERCIAL

## 2022-10-06 DIAGNOSIS — Z00.00 ROUTINE GENERAL MEDICAL EXAMINATION AT A HEALTH CARE FACILITY: Primary | ICD-10-CM

## 2022-10-06 LAB
HBV SURFACE AB SER QL: REACTIVE
HBV SURFACE AB SERPL IA-ACNC: 749.12 MIU/ML
HCV AB SERPL QL IA: NONREACTIVE
LEPTIN: 23.7 NG/ML
VITAMIN B1 (THIAMINE), WHOLE B: 137 NMOL/L

## 2022-10-06 PROCEDURE — 97140 MANUAL THERAPY 1/> REGIONS: CPT

## 2022-10-06 PROCEDURE — 86803 HEPATITIS C AB TEST: CPT

## 2022-10-06 PROCEDURE — 86706 HEP B SURFACE ANTIBODY: CPT

## 2022-10-06 PROCEDURE — 87389 HIV-1 AG W/HIV-1&-2 AB AG IA: CPT

## 2022-10-06 PROCEDURE — 97110 THERAPEUTIC EXERCISES: CPT

## 2022-10-10 ENCOUNTER — APPOINTMENT (OUTPATIENT)
Dept: PHYSICAL THERAPY | Facility: HOSPITAL | Age: 52
End: 2022-10-10
Attending: PHYSICIAN ASSISTANT
Payer: COMMERCIAL

## 2022-10-12 ENCOUNTER — APPOINTMENT (OUTPATIENT)
Dept: PHYSICAL THERAPY | Facility: HOSPITAL | Age: 52
End: 2022-10-12
Attending: PHYSICIAN ASSISTANT
Payer: COMMERCIAL

## 2022-10-18 ENCOUNTER — APPOINTMENT (OUTPATIENT)
Dept: PHYSICAL THERAPY | Facility: HOSPITAL | Age: 52
End: 2022-10-18
Attending: PHYSICIAN ASSISTANT
Payer: COMMERCIAL

## 2022-11-15 ENCOUNTER — IMMUNIZATION (OUTPATIENT)
Dept: LAB | Facility: HOSPITAL | Age: 52
End: 2022-11-15
Attending: PREVENTIVE MEDICINE
Payer: COMMERCIAL

## 2022-11-15 DIAGNOSIS — Z23 NEED FOR VACCINATION: Primary | ICD-10-CM

## 2022-11-15 PROCEDURE — 90471 IMMUNIZATION ADMIN: CPT

## 2022-11-21 ENCOUNTER — OFFICE VISIT (OUTPATIENT)
Dept: INTERNAL MEDICINE CLINIC | Facility: CLINIC | Age: 52
End: 2022-11-21
Payer: COMMERCIAL

## 2022-11-21 VITALS
OXYGEN SATURATION: 97 % | HEART RATE: 87 BPM | SYSTOLIC BLOOD PRESSURE: 120 MMHG | WEIGHT: 165.19 LBS | BODY MASS INDEX: 33.3 KG/M2 | DIASTOLIC BLOOD PRESSURE: 84 MMHG | HEIGHT: 59 IN

## 2022-11-21 DIAGNOSIS — M25.50 POLYARTHRALGIA: ICD-10-CM

## 2022-11-21 DIAGNOSIS — R73.03 PRE-DIABETES: Primary | ICD-10-CM

## 2022-11-21 DIAGNOSIS — E66.9 OBESITY, CLASS I, BMI 30-34.9: ICD-10-CM

## 2022-11-21 DIAGNOSIS — F33.1 MAJOR DEPRESSIVE DISORDER, RECURRENT EPISODE, MODERATE (HCC): ICD-10-CM

## 2022-11-21 DIAGNOSIS — R63.5 ABNORMAL WEIGHT GAIN: ICD-10-CM

## 2022-11-21 DIAGNOSIS — E78.5 HYPERLIPIDEMIA, UNSPECIFIED HYPERLIPIDEMIA TYPE: ICD-10-CM

## 2022-11-21 PROBLEM — E86.0 DEHYDRATION: Status: ACTIVE | Noted: 2018-12-19

## 2022-11-21 PROBLEM — T45.4X5S IRON ADVERSE REACTION, SEQUELA: Status: ACTIVE | Noted: 2019-08-14

## 2022-11-21 PROBLEM — R23.3 EASY BRUISING: Status: ACTIVE | Noted: 2018-12-26

## 2022-11-21 PROBLEM — G44.099 TRIGEMINAL AUTONOMIC CEPHALGIAS: Status: ACTIVE | Noted: 2022-11-21

## 2022-11-21 PROBLEM — D47.2 MGUS (MONOCLONAL GAMMOPATHY OF UNKNOWN SIGNIFICANCE): Status: ACTIVE | Noted: 2019-08-14

## 2022-11-21 PROBLEM — R19.7 DIARRHEA: Status: ACTIVE | Noted: 2018-12-21

## 2022-11-21 PROBLEM — R87.619 ABNORMAL PAP SMEAR OF CERVIX: Status: ACTIVE | Noted: 2021-01-19

## 2022-11-21 PROBLEM — R15.9 FULL INCONTINENCE OF FECES: Status: ACTIVE | Noted: 2018-12-21

## 2022-11-21 PROBLEM — N20.1 CALCULUS OF URETER: Status: ACTIVE | Noted: 2022-11-21

## 2022-11-21 PROBLEM — N20.0 KIDNEY STONE: Status: ACTIVE | Noted: 2022-11-21

## 2022-11-21 PROBLEM — D50.9 IRON (FE) DEFICIENCY ANEMIA: Status: ACTIVE | Noted: 2018-11-07

## 2022-11-21 PROBLEM — IMO0002 ULCER: Status: ACTIVE | Noted: 2021-11-23

## 2022-11-21 PROBLEM — E53.8 VITAMIN B 12 DEFICIENCY: Status: ACTIVE | Noted: 2018-11-07

## 2022-11-21 PROBLEM — R41.3 AMNESIA: Status: ACTIVE | Noted: 2022-11-21

## 2022-11-21 PROBLEM — E66.01 MORBID OBESITY (HCC): Status: ACTIVE | Noted: 2022-02-15

## 2022-11-21 PROBLEM — R01.1 MURMUR, HEART: Status: ACTIVE | Noted: 2018-07-24

## 2022-11-21 RX ORDER — PHENTERMINE HYDROCHLORIDE 37.5 MG/1
37.5 TABLET ORAL
Qty: 60 TABLET | Refills: 0 | Status: SHIPPED | OUTPATIENT
Start: 2022-11-21 | End: 2023-01-20

## 2022-11-22 ENCOUNTER — LAB ENCOUNTER (OUTPATIENT)
Dept: LAB | Age: 52
End: 2022-11-22
Attending: INTERNAL MEDICINE
Payer: COMMERCIAL

## 2022-11-22 DIAGNOSIS — M25.50 POLYARTHRALGIA: ICD-10-CM

## 2022-11-22 DIAGNOSIS — E78.5 HYPERLIPIDEMIA, UNSPECIFIED HYPERLIPIDEMIA TYPE: ICD-10-CM

## 2022-11-22 DIAGNOSIS — F33.1 MAJOR DEPRESSIVE DISORDER, RECURRENT EPISODE, MODERATE (HCC): ICD-10-CM

## 2022-11-22 DIAGNOSIS — R63.5 ABNORMAL WEIGHT GAIN: ICD-10-CM

## 2022-11-22 DIAGNOSIS — E66.9 OBESITY, CLASS I, BMI 30-34.9: ICD-10-CM

## 2022-11-22 DIAGNOSIS — R73.03 PRE-DIABETES: ICD-10-CM

## 2022-11-22 LAB
CHOLEST SERPL-MCNC: 208 MG/DL (ref ?–200)
EST. AVERAGE GLUCOSE BLD GHB EST-MCNC: 120 MG/DL (ref 68–126)
FASTING PATIENT LIPID ANSWER: YES
HBA1C MFR BLD: 5.8 % (ref ?–5.7)
HDLC SERPL-MCNC: 85 MG/DL (ref 40–59)
LDLC SERPL CALC-MCNC: 107 MG/DL (ref ?–100)
NONHDLC SERPL-MCNC: 123 MG/DL (ref ?–130)
RHEUMATOID FACT SERPL-ACNC: <10 IU/ML (ref ?–15)
THYROPEROXIDASE AB SERPL-ACNC: 53 U/ML (ref ?–60)
TRIGL SERPL-MCNC: 92 MG/DL (ref 30–149)
TSI SER-ACNC: 0.68 MIU/ML (ref 0.36–3.74)
VLDLC SERPL CALC-MCNC: 16 MG/DL (ref 0–30)

## 2022-11-22 PROCEDURE — 80061 LIPID PANEL: CPT

## 2022-11-22 PROCEDURE — 86038 ANTINUCLEAR ANTIBODIES: CPT

## 2022-11-22 PROCEDURE — 83036 HEMOGLOBIN GLYCOSYLATED A1C: CPT

## 2022-11-22 PROCEDURE — 84443 ASSAY THYROID STIM HORMONE: CPT

## 2022-11-22 PROCEDURE — 86376 MICROSOMAL ANTIBODY EACH: CPT

## 2022-11-22 PROCEDURE — 86225 DNA ANTIBODY NATIVE: CPT

## 2022-11-22 PROCEDURE — 86200 CCP ANTIBODY: CPT

## 2022-11-22 PROCEDURE — 86431 RHEUMATOID FACTOR QUANT: CPT

## 2022-11-25 LAB
CCP IGG SERPL-ACNC: 0.4 U/ML (ref 0–6.9)
DSDNA IGG SERPL IA-ACNC: 0.9 IU/ML
ENA AB SER QL IA: 0.1 UG/L
ENA AB SER QL IA: NEGATIVE

## 2022-11-28 ENCOUNTER — NURSE TRIAGE (OUTPATIENT)
Dept: FAMILY MEDICINE CLINIC | Facility: CLINIC | Age: 52
End: 2022-11-28

## 2022-11-28 ENCOUNTER — HOSPITAL ENCOUNTER (EMERGENCY)
Facility: HOSPITAL | Age: 52
Discharge: HOME | End: 2022-11-28
Attending: EMERGENCY MEDICINE
Payer: COMMERCIAL

## 2022-11-28 ENCOUNTER — APPOINTMENT (OUTPATIENT)
Dept: GENERAL RADIOLOGY | Facility: HOSPITAL | Age: 52
End: 2022-11-28
Payer: COMMERCIAL

## 2022-11-28 VITALS
TEMPERATURE: 98 F | WEIGHT: 160 LBS | BODY MASS INDEX: 32 KG/M2 | SYSTOLIC BLOOD PRESSURE: 100 MMHG | HEART RATE: 64 BPM | DIASTOLIC BLOOD PRESSURE: 69 MMHG | OXYGEN SATURATION: 98 % | RESPIRATION RATE: 16 BRPM

## 2022-11-28 DIAGNOSIS — R07.89 CHEST PAIN, ATYPICAL: Primary | ICD-10-CM

## 2022-11-28 LAB
ANION GAP SERPL CALC-SCNC: 6 MMOL/L (ref 0–18)
ATRIAL RATE: 79 BPM
BASOPHILS # BLD AUTO: 0.05 X10(3) UL (ref 0–0.2)
BASOPHILS NFR BLD AUTO: 0.7 %
BUN BLD-MCNC: 29 MG/DL (ref 7–18)
BUN/CREAT SERPL: 36.7 (ref 10–20)
CALCIUM BLD-MCNC: 9.8 MG/DL (ref 8.5–10.1)
CHLORIDE SERPL-SCNC: 103 MMOL/L (ref 98–112)
CO2 SERPL-SCNC: 30 MMOL/L (ref 21–32)
CREAT BLD-MCNC: 0.79 MG/DL
DEPRECATED RDW RBC AUTO: 39.3 FL (ref 35.1–46.3)
EOSINOPHIL # BLD AUTO: 0.19 X10(3) UL (ref 0–0.7)
EOSINOPHIL NFR BLD AUTO: 2.7 %
ERYTHROCYTE [DISTWIDTH] IN BLOOD BY AUTOMATED COUNT: 12.7 % (ref 11–15)
GFR SERPLBLD BASED ON 1.73 SQ M-ARVRAT: 89 ML/MIN/1.73M2 (ref 60–?)
GLUCOSE BLD-MCNC: 84 MG/DL (ref 70–99)
HCT VFR BLD AUTO: 44.4 %
HGB BLD-MCNC: 14.4 G/DL
IMM GRANULOCYTES # BLD AUTO: 0.03 X10(3) UL (ref 0–1)
IMM GRANULOCYTES NFR BLD: 0.4 %
LYMPHOCYTES # BLD AUTO: 2.38 X10(3) UL (ref 1–4)
LYMPHOCYTES NFR BLD AUTO: 34 %
MCH RBC QN AUTO: 27.6 PG (ref 26–34)
MCHC RBC AUTO-ENTMCNC: 32.4 G/DL (ref 31–37)
MCV RBC AUTO: 85.2 FL
MONOCYTES # BLD AUTO: 0.4 X10(3) UL (ref 0.1–1)
MONOCYTES NFR BLD AUTO: 5.7 %
NEUTROPHILS # BLD AUTO: 3.94 X10 (3) UL (ref 1.5–7.7)
NEUTROPHILS # BLD AUTO: 3.94 X10(3) UL (ref 1.5–7.7)
NEUTROPHILS NFR BLD AUTO: 56.5 %
OSMOLALITY SERPL CALC.SUM OF ELEC: 293 MOSM/KG (ref 275–295)
P AXIS: 22 DEGREES
P-R INTERVAL: 158 MS
PLATELET # BLD AUTO: 324 10(3)UL (ref 150–450)
POTASSIUM SERPL-SCNC: 4.1 MMOL/L (ref 3.5–5.1)
Q-T INTERVAL: 372 MS
QRS DURATION: 92 MS
QTC CALCULATION (BEZET): 426 MS
R AXIS: -3 DEGREES
RBC # BLD AUTO: 5.21 X10(6)UL
SODIUM SERPL-SCNC: 139 MMOL/L (ref 136–145)
T AXIS: 40 DEGREES
TROPONIN I HIGH SENSITIVITY: <3 NG/L
VENTRICULAR RATE: 79 BPM
WBC # BLD AUTO: 7 X10(3) UL (ref 4–11)

## 2022-11-28 PROCEDURE — 80048 BASIC METABOLIC PNL TOTAL CA: CPT | Performed by: EMERGENCY MEDICINE

## 2022-11-28 PROCEDURE — 93010 ELECTROCARDIOGRAM REPORT: CPT | Performed by: EMERGENCY MEDICINE

## 2022-11-28 PROCEDURE — 99284 EMERGENCY DEPT VISIT MOD MDM: CPT

## 2022-11-28 PROCEDURE — 93005 ELECTROCARDIOGRAM TRACING: CPT

## 2022-11-28 PROCEDURE — 71045 X-RAY EXAM CHEST 1 VIEW: CPT | Performed by: EMERGENCY MEDICINE

## 2022-11-28 PROCEDURE — 99285 EMERGENCY DEPT VISIT HI MDM: CPT

## 2022-11-28 PROCEDURE — 85025 COMPLETE CBC W/AUTO DIFF WBC: CPT

## 2022-11-28 PROCEDURE — 84484 ASSAY OF TROPONIN QUANT: CPT

## 2022-11-28 PROCEDURE — 85025 COMPLETE CBC W/AUTO DIFF WBC: CPT | Performed by: EMERGENCY MEDICINE

## 2022-11-28 PROCEDURE — 96374 THER/PROPH/DIAG INJ IV PUSH: CPT

## 2022-11-28 PROCEDURE — 80048 BASIC METABOLIC PNL TOTAL CA: CPT

## 2022-11-28 PROCEDURE — 84484 ASSAY OF TROPONIN QUANT: CPT | Performed by: EMERGENCY MEDICINE

## 2022-11-28 RX ORDER — KETOROLAC TROMETHAMINE 15 MG/ML
15 INJECTION, SOLUTION INTRAMUSCULAR; INTRAVENOUS ONCE
Status: COMPLETED | OUTPATIENT
Start: 2022-11-28 | End: 2022-11-28

## 2022-11-28 RX ORDER — IBUPROFEN 600 MG/1
600 TABLET ORAL EVERY 8 HOURS PRN
Qty: 30 TABLET | Refills: 0 | Status: SHIPPED | OUTPATIENT
Start: 2022-11-28 | End: 2022-12-05

## 2022-11-28 NOTE — DISCHARGE INSTRUCTIONS
Take ibuprofen 600 mg every 8 hours for the next few days and then as needed for pain thereafter. See primary care on Thursday as previously scheduled. As we discussed you will likely need further testing such as stress testing. Return to the ER if you develop worsening symptoms, worsening or moving chest pain or pressure, shortness of breath or difficulty breathing, fainting, or any emergent concerns.

## 2022-11-28 NOTE — LETTER
WHERE IS YOUR PAIN NOW?  Leann the areas on your body where you feel the described sensations.  Use the appropriate symbol.  Leann the areas of radiation.  Include all affected areas.  Just to complete the picture, please draw in the face.     ACHE:  ^ ^ ^   NUMBNESS:  0000   PINS & NEEDLES:  = = = =                              ^ ^ ^                       0000              = = = =                                    ^ ^ ^                       0000            = = = =      BURNING:  XXXX   STABBING: ////                  XXXX                ////                         XXXX          ////     Please leann the line below indicating your degree of pain right now  with 0 being no pain 10 being the worst pain possible.                                         0             1             2              3             4              5              6              7             8             9             10         Patient Signature:             No

## 2022-11-28 NOTE — TELEPHONE ENCOUNTER
Pt calling to follow up on ER visit. Pt did not have a heart attack. May be pericarditis or a severe muscle strain. Antiinflammatory meds given. F/u appointment is scheduled for thurs.

## 2022-11-28 NOTE — ED INITIAL ASSESSMENT (HPI)
Patient with c/o cp that began 6 days ago. States the pain is intermittent and throbbing.  Admits last week she was having shortness of breath and n/v.

## 2022-12-01 ENCOUNTER — OFFICE VISIT (OUTPATIENT)
Dept: FAMILY MEDICINE CLINIC | Facility: CLINIC | Age: 52
End: 2022-12-01
Payer: COMMERCIAL

## 2022-12-01 VITALS
HEART RATE: 93 BPM | WEIGHT: 168 LBS | HEIGHT: 59 IN | BODY MASS INDEX: 33.87 KG/M2 | SYSTOLIC BLOOD PRESSURE: 119 MMHG | DIASTOLIC BLOOD PRESSURE: 82 MMHG

## 2022-12-01 DIAGNOSIS — R07.9 CHEST PAIN, UNSPECIFIED TYPE: ICD-10-CM

## 2022-12-01 DIAGNOSIS — R94.31 ABNORMAL EKG: Primary | ICD-10-CM

## 2022-12-01 PROBLEM — R19.7 DIARRHEA: Status: RESOLVED | Noted: 2018-12-21 | Resolved: 2022-12-01

## 2022-12-01 PROBLEM — K92.1 BLOOD IN STOOL: Status: RESOLVED | Noted: 2022-04-23 | Resolved: 2022-12-01

## 2022-12-01 PROCEDURE — 3074F SYST BP LT 130 MM HG: CPT | Performed by: PHYSICIAN ASSISTANT

## 2022-12-01 PROCEDURE — 3008F BODY MASS INDEX DOCD: CPT | Performed by: PHYSICIAN ASSISTANT

## 2022-12-01 PROCEDURE — 3079F DIAST BP 80-89 MM HG: CPT | Performed by: PHYSICIAN ASSISTANT

## 2022-12-01 PROCEDURE — 99213 OFFICE O/P EST LOW 20 MIN: CPT | Performed by: PHYSICIAN ASSISTANT

## 2022-12-08 ENCOUNTER — HOSPITAL ENCOUNTER (OUTPATIENT)
Dept: CV DIAGNOSTICS | Facility: HOSPITAL | Age: 52
Discharge: HOME | End: 2022-12-08
Attending: PHYSICIAN ASSISTANT
Payer: COMMERCIAL

## 2022-12-08 DIAGNOSIS — R94.31 ABNORMAL EKG: ICD-10-CM

## 2022-12-08 DIAGNOSIS — R07.9 CHEST PAIN, UNSPECIFIED TYPE: ICD-10-CM

## 2022-12-08 LAB
% OF MAX PREDICTED HR: 100 %
MAX DIASTOLIC BP: 79 MMHG
MAX HEART RATE: 148 BPM
MAX PREDICTED HEART RATE: 166 BPM
MAX SYSTOLIC BP: 166 MMHG
MAX WORK LOAD: 134

## 2022-12-08 PROCEDURE — 93017 CV STRESS TEST TRACING ONLY: CPT | Performed by: PHYSICIAN ASSISTANT

## 2022-12-08 PROCEDURE — 93018 CV STRESS TEST I&R ONLY: CPT | Performed by: STUDENT IN AN ORGANIZED HEALTH CARE EDUCATION/TRAINING PROGRAM

## 2022-12-08 PROCEDURE — 93016 CV STRESS TEST SUPVJ ONLY: CPT | Performed by: STUDENT IN AN ORGANIZED HEALTH CARE EDUCATION/TRAINING PROGRAM

## 2022-12-08 PROCEDURE — 93350 STRESS TTE ONLY: CPT | Performed by: PHYSICIAN ASSISTANT

## 2022-12-11 ENCOUNTER — HOSPITAL ENCOUNTER (OUTPATIENT)
Age: 52
Discharge: HOME | End: 2022-12-11
Payer: COMMERCIAL

## 2022-12-11 VITALS
SYSTOLIC BLOOD PRESSURE: 123 MMHG | DIASTOLIC BLOOD PRESSURE: 82 MMHG | HEART RATE: 94 BPM | OXYGEN SATURATION: 98 % | TEMPERATURE: 98 F | RESPIRATION RATE: 20 BRPM

## 2022-12-11 DIAGNOSIS — J06.9 VIRAL URI WITH COUGH: Primary | ICD-10-CM

## 2022-12-11 LAB
POCT INFLUENZA A: NEGATIVE
POCT INFLUENZA B: NEGATIVE

## 2022-12-11 PROCEDURE — 99213 OFFICE O/P EST LOW 20 MIN: CPT

## 2022-12-11 PROCEDURE — 87502 INFLUENZA DNA AMP PROBE: CPT | Performed by: NURSE PRACTITIONER

## 2022-12-11 RX ORDER — BENZONATATE 100 MG/1
100 CAPSULE ORAL 3 TIMES DAILY PRN
Qty: 30 CAPSULE | Refills: 0 | Status: SHIPPED | OUTPATIENT
Start: 2022-12-11 | End: 2022-12-15 | Stop reason: DRUGHIGH

## 2022-12-11 RX ORDER — CODEINE PHOSPHATE AND GUAIFENESIN 10; 100 MG/5ML; MG/5ML
5 SOLUTION ORAL EVERY 6 HOURS PRN
Qty: 120 ML | Refills: 0 | Status: SHIPPED | OUTPATIENT
Start: 2022-12-11 | End: 2022-12-18

## 2022-12-11 NOTE — DISCHARGE INSTRUCTIONS
Please push fluids and make sure you are staying hydrated. Tylenol or Motrin for pain. Close follow-up with primary care provider as recommended.

## 2022-12-15 ENCOUNTER — OFFICE VISIT (OUTPATIENT)
Dept: FAMILY MEDICINE CLINIC | Facility: CLINIC | Age: 52
End: 2022-12-15
Payer: COMMERCIAL

## 2022-12-15 VITALS
DIASTOLIC BLOOD PRESSURE: 88 MMHG | WEIGHT: 169 LBS | HEART RATE: 83 BPM | SYSTOLIC BLOOD PRESSURE: 129 MMHG | BODY MASS INDEX: 34.07 KG/M2 | HEIGHT: 59 IN

## 2022-12-15 DIAGNOSIS — R09.81 NASAL CONGESTION: ICD-10-CM

## 2022-12-15 DIAGNOSIS — R05.1 ACUTE COUGH: Primary | ICD-10-CM

## 2022-12-15 PROCEDURE — 3079F DIAST BP 80-89 MM HG: CPT | Performed by: PHYSICIAN ASSISTANT

## 2022-12-15 PROCEDURE — 3074F SYST BP LT 130 MM HG: CPT | Performed by: PHYSICIAN ASSISTANT

## 2022-12-15 PROCEDURE — 99213 OFFICE O/P EST LOW 20 MIN: CPT | Performed by: PHYSICIAN ASSISTANT

## 2022-12-15 PROCEDURE — 3008F BODY MASS INDEX DOCD: CPT | Performed by: PHYSICIAN ASSISTANT

## 2022-12-15 RX ORDER — FLUTICASONE PROPIONATE 50 MCG
2 SPRAY, SUSPENSION (ML) NASAL DAILY
Qty: 16 G | Refills: 2 | Status: SHIPPED | OUTPATIENT
Start: 2022-12-15 | End: 2023-01-14

## 2022-12-15 RX ORDER — CETIRIZINE HYDROCHLORIDE 10 MG/1
10 TABLET ORAL DAILY
Qty: 90 TABLET | Refills: 3 | Status: SHIPPED | OUTPATIENT
Start: 2022-12-15

## 2022-12-15 RX ORDER — BENZONATATE 200 MG/1
200 CAPSULE ORAL 3 TIMES DAILY PRN
Qty: 30 CAPSULE | Refills: 0 | Status: SHIPPED | OUTPATIENT
Start: 2022-12-15

## 2022-12-15 RX ORDER — PREDNISONE 20 MG/1
TABLET ORAL
Qty: 10 TABLET | Refills: 0 | Status: SHIPPED | OUTPATIENT
Start: 2022-12-15

## 2022-12-29 RX ORDER — METOPROLOL SUCCINATE 25 MG/1
25 TABLET, EXTENDED RELEASE ORAL DAILY
Qty: 90 TABLET | Refills: 1 | Status: CANCELLED | OUTPATIENT
Start: 2022-12-29

## 2022-12-29 RX ORDER — LOSARTAN POTASSIUM 50 MG/1
50 TABLET ORAL DAILY
Qty: 90 TABLET | Refills: 1 | Status: CANCELLED | OUTPATIENT
Start: 2022-12-29

## 2022-12-29 RX ORDER — HYDROCHLOROTHIAZIDE 25 MG/1
25 TABLET ORAL DAILY
Qty: 90 TABLET | Refills: 1 | Status: CANCELLED | OUTPATIENT
Start: 2022-12-29

## 2023-01-03 ENCOUNTER — LAB ENCOUNTER (OUTPATIENT)
Dept: LAB | Age: 53
End: 2023-01-03
Attending: PREVENTIVE MEDICINE
Payer: COMMERCIAL

## 2023-01-03 ENCOUNTER — TELEPHONE (OUTPATIENT)
Dept: INTERNAL MEDICINE CLINIC | Facility: HOSPITAL | Age: 53
End: 2023-01-03

## 2023-01-03 ENCOUNTER — TELEPHONE (OUTPATIENT)
Dept: FAMILY MEDICINE CLINIC | Facility: CLINIC | Age: 53
End: 2023-01-03

## 2023-01-03 DIAGNOSIS — Z20.822 SUSPECTED COVID-19 VIRUS INFECTION: Primary | ICD-10-CM

## 2023-01-03 DIAGNOSIS — Z20.822 SUSPECTED COVID-19 VIRUS INFECTION: ICD-10-CM

## 2023-01-03 LAB — SARS-COV-2 RNA RESP QL NAA+PROBE: DETECTED

## 2023-01-03 RX ORDER — CODEINE PHOSPHATE AND GUAIFENESIN 10; 100 MG/5ML; MG/5ML
5 SOLUTION ORAL EVERY 6 HOURS PRN
Qty: 120 ML | Refills: 0 | Status: SHIPPED | OUTPATIENT
Start: 2023-01-03

## 2023-01-03 NOTE — TELEPHONE ENCOUNTER
Pt states she tested positive for COVID on Saturday with home test.  Pt is going to [facility] for PCR. Pt is an employee. Pt states the cough medications are not helping much (benzonatate and cough syrup). Pt states when she states she has coughing fits and it takes a while to stop. Pt asking for Paxlovid or other cough medication. Symptoms started Saturday. Pt was seen in the office for cough. Pt asking for cough medicine that will help her sleep at night. Please advise. m: Junie Zamudio  To: Aquilino Hou PA-C  Sent: 1/2/2023 10:20 AM CST  Subject: Covid     Hi, Happy New Year. I came down with Covid Saturday night. I need a refill on the cough med with codeine. My joint pain is tremendous, I can't swallow and when I do I have sharp pain in my ears. My head feels like it's going to explode. Is there any medication to help ease the Covid symptoms?  My pharmacy [facility] [address]

## 2023-01-03 NOTE — TELEPHONE ENCOUNTER
Results and RTW guidelines:    COVID RESULT:    [x] Viewed by employee in [address] RTW plan and instructions as indicated on triage call. Manager notified. Estimated RTW date:   [x] Discussed with employee   [] Unable to reach by phone. Sent via [employer] message      Test type:    [x] Rapid         [] Alinity         [] Outside test:       [x] Positive     - Employee should quarantine at home for at least 5 days (day 1 is day after sx onset) , follow the CDC guidelines for cleaning and                              quarantining; see CDC.gov   -This employee may RTW on day 6 if asymptomatic or mildly symptomatic (with improving symptoms). Call Employee Health on day 5 if unable to return on day 6 after                      symptom onset.    -This employee needs to call Employee Health on day 5 after symptom onset. The employee needs to be cleared by Employee [facility]. - Monitor symptoms and temperature                 - Notify PCP of result                 - Seek emergent care with worsening symptoms   - If employee is still experiencing severe symptoms on day 5 must make a RTW appt with [employer] [facility], Employee will not be cleared if:    1. Has consistent cough, shortness of breath or fatigue that restricts your physical activities    2. Is still feeling \"unwell\"    3. Within 15 days of hospitalization for COVID    4. Within 20 days of intubation for COVID    5.  Still has a fever, vomiting or diarrhea   - Keep communication open with management about RTW and if symptoms worsen                - If outside testing completed, bring a copy of result to RTW appointment           Notes:     RTW PLAN:    [x]  If COVID positive results, off work minimum of 5 days from positive test or onset of symptoms (day 0)        On day 5, if asymptomatic or mildly symptomatic (with improving symptoms) may return to work day 6          On day 5, if symptomatic, call Employee Health for RTW screening        []  COVID positive result - call Employee Health on day 5 after symptom onset. The employee needs to be cleared by Employee Health to RTW. [] RTW immediately, continue to monitor for sx  [] RTW when sx improve; must be fever free for 24 hours w/o medications, Diarrhea/Vomiting free for 24 hours w/o medications  [] Alinity ordered; continue to monitor sx and call for new/worsening sx.   Discuss RTW guidelines with manager  [] May continue to work  [x] Follow up with PCP  [] Home until further instruction from hotline with Alinity results  INSTRUCTIONS PROVIDED:  [x]  Plan as noted above  []  Length of time to obtain results   [x]  Quarantine instructions  [x]  Masking protocol   []  S/S of worsening infection/condition and importance of prompt medical re-evaluation including when to seek emergency care  [] If symptoms develop, stay home and call hotline for rapid test order    Estimated RTW date:  1/6/23    [x] The employee voiced understanding of above plan/instructions  [x] Manager Notified

## 2023-01-09 ENCOUNTER — OFFICE VISIT (OUTPATIENT)
Dept: OTHER | Facility: HOSPITAL | Age: 53
End: 2023-01-09
Attending: EMERGENCY MEDICINE

## 2023-01-09 DIAGNOSIS — Z77.21 PERSONAL HISTORY OF EXPOSURE TO POTENTIALLY HAZARDOUS BODY FLUIDS: Primary | ICD-10-CM

## 2023-01-09 LAB
HBV SURFACE AB SER QL: REACTIVE
HBV SURFACE AB SERPL IA-ACNC: 716.87 MIU/ML
HCV AB SERPL QL IA: NONREACTIVE

## 2023-01-09 PROCEDURE — 86803 HEPATITIS C AB TEST: CPT

## 2023-01-09 PROCEDURE — 86706 HEP B SURFACE ANTIBODY: CPT

## 2023-01-09 PROCEDURE — 87389 HIV-1 AG W/HIV-1&-2 AB AG IA: CPT

## 2023-02-08 ENCOUNTER — OFFICE VISIT (OUTPATIENT)
Dept: OTHER | Facility: HOSPITAL | Age: 53
End: 2023-02-08
Attending: EMERGENCY MEDICINE

## 2023-02-08 DIAGNOSIS — Z77.21 PERSONAL HISTORY OF EXPOSURE TO POTENTIALLY HAZARDOUS BODY FLUIDS: Primary | ICD-10-CM

## 2023-02-08 LAB
HBV SURFACE AB SER QL: REACTIVE
HBV SURFACE AB SERPL IA-ACNC: 613.3 MIU/ML
HCV AB SERPL QL IA: NONREACTIVE

## 2023-02-08 PROCEDURE — 87389 HIV-1 AG W/HIV-1&-2 AB AG IA: CPT

## 2023-02-08 PROCEDURE — 86803 HEPATITIS C AB TEST: CPT

## 2023-02-08 PROCEDURE — 86706 HEP B SURFACE ANTIBODY: CPT

## 2023-02-25 DIAGNOSIS — R09.81 NASAL CONGESTION: ICD-10-CM

## 2023-02-25 RX ORDER — FLUTICASONE PROPIONATE 50 MCG
2 SPRAY, SUSPENSION (ML) NASAL DAILY
Qty: 1 EACH | Refills: 1 | Status: SHIPPED | OUTPATIENT
Start: 2023-02-25 | End: 2023-02-27

## 2023-02-26 NOTE — TELEPHONE ENCOUNTER
Refill passed per [facility] protocol   Requested Prescriptions   Pending Prescriptions Disp Refills    FLUTICASONE PROPIONATE 50 MCG/ACT Nasal Suspension [Pharmacy Med Name: FLUTICASONE PROP 50 MCG SPRAY] 48 mL 0     Si sprays by Each Nare route daily for 30 doses.        Allergy Medication Protocol Passed - 2023  4:16 PM        Passed - In person appointment or virtual visit in the past 12 mos or appointment in next 3 mos     Recent Outpatient Visits              2 weeks ago Personal history of exposure to potentially hazardous body fluids    [facility]    Office Visit    1 month ago Personal history of exposure to potentially hazardous body fluids    [facility]    Office Visit    2 months ago Acute cough    [facility], [address], [place], [place]    Office Visit    2 months ago Abnormal EKG    [address], [address], [place], [place]    Office Visit    3 months ago Anali Kerns MD    Office Visit          Future Appointments         Provider Department Appt Notes    In 4 days Ramona Drew  Dariela Bingham Weight loss meds    In 4 weeks Mariella Merida MD [address], [address], [address] F/u    In 1 month Mariella Merida  Dariela Bingham No VM set up, appt needs to be rescheduled to 4/ af  F/u    In 2 months Mariella Merida MD [address], [address], [address] F/u    In 3 months Mariella Merida MD [address], [address], [address] F/u

## 2023-03-01 ENCOUNTER — OFFICE VISIT (OUTPATIENT)
Dept: SURGERY | Facility: CLINIC | Age: 53
End: 2023-03-01
Payer: COMMERCIAL

## 2023-03-01 VITALS
HEART RATE: 87 BPM | SYSTOLIC BLOOD PRESSURE: 102 MMHG | DIASTOLIC BLOOD PRESSURE: 70 MMHG | BODY MASS INDEX: 34.47 KG/M2 | OXYGEN SATURATION: 97 % | HEIGHT: 58.9 IN | WEIGHT: 171 LBS

## 2023-03-01 DIAGNOSIS — R73.03 PRE-DIABETES: ICD-10-CM

## 2023-03-01 DIAGNOSIS — E78.5 DYSLIPIDEMIA: ICD-10-CM

## 2023-03-01 DIAGNOSIS — I10 HTN (HYPERTENSION), BENIGN: ICD-10-CM

## 2023-03-01 DIAGNOSIS — E66.9 OBESITY (BMI 30-39.9): ICD-10-CM

## 2023-03-01 DIAGNOSIS — E44.1 MILD PROTEIN-CALORIE MALNUTRITION (HCC): Primary | ICD-10-CM

## 2023-03-01 PROCEDURE — 99214 OFFICE O/P EST MOD 30 MIN: CPT | Performed by: INTERNAL MEDICINE

## 2023-03-01 PROCEDURE — 3078F DIAST BP <80 MM HG: CPT | Performed by: INTERNAL MEDICINE

## 2023-03-01 PROCEDURE — 3008F BODY MASS INDEX DOCD: CPT | Performed by: INTERNAL MEDICINE

## 2023-03-01 PROCEDURE — 3074F SYST BP LT 130 MM HG: CPT | Performed by: INTERNAL MEDICINE

## 2023-03-02 ENCOUNTER — TELEPHONE (OUTPATIENT)
Dept: SURGERY | Facility: CLINIC | Age: 53
End: 2023-03-02

## 2023-03-02 NOTE — TELEPHONE ENCOUNTER
Left a detailed message regarding prescription for [facility]. Patient received sample in the office of the 0.25 dose. Doctor sent in Rx for the  [facility] 0.5.

## 2023-03-05 ENCOUNTER — HOSPITAL ENCOUNTER (EMERGENCY)
Facility: HOSPITAL | Age: 53
Discharge: HOME | End: 2023-03-05
Attending: EMERGENCY MEDICINE
Payer: COMMERCIAL

## 2023-03-05 VITALS
DIASTOLIC BLOOD PRESSURE: 91 MMHG | SYSTOLIC BLOOD PRESSURE: 138 MMHG | RESPIRATION RATE: 19 BRPM | BODY MASS INDEX: 34.27 KG/M2 | OXYGEN SATURATION: 98 % | TEMPERATURE: 97 F | HEIGHT: 59 IN | HEART RATE: 91 BPM | WEIGHT: 170 LBS

## 2023-03-05 DIAGNOSIS — S41.119A LACERATION OF UPPER EXTREMITY, UNSPECIFIED LATERALITY, INITIAL ENCOUNTER: Primary | ICD-10-CM

## 2023-03-05 PROCEDURE — 12001 RPR S/N/AX/GEN/TRNK 2.5CM/<: CPT

## 2023-03-05 PROCEDURE — 99283 EMERGENCY DEPT VISIT LOW MDM: CPT

## 2023-03-05 NOTE — ED QUICK NOTES
Pts bandage from home removed by Dr. Watson Porras. Bleeding is well controlled @ this time. (+) radial pulses palpable, cap refill < 3 seconds, hand pink/dry. Pt also w/ approx 1cm superficial cut to L 4th digit.

## 2023-03-05 NOTE — ED QUICK NOTES
Pt ambulatory to ED tx room 43 from triage, A&O x 4, answering all ?s appropriately. Pt reporting she cut her L wrist on some broken glass this AM.  Pt reporting she lost approx 10 cc of blood and immediately placed a tourniquet & coban around lac. Pt reporting it was pulsating. Bandage from home CDI, reporting hand feels \"throbbing. \"  Moves all digits well, denies numbness/tingling.

## 2023-03-05 NOTE — DISCHARGE INSTRUCTIONS
Return to emergency room for any fevers of 100.4, redness around laceration site, pus from laceration site. Wound recheck in 2 days with primary care provider/urgent care. Suture removal in 10 to 14 days. The Emergency Department is not intended to be a substitute for an effort to provide complete medical care. The imaging, if any, have often been interpreted on a preliminary basis pending final reading by the radiologist. If your blood pressure was greater than 140/90, please have this blood pressure rechecked by your primary care provider in the next several days.

## 2023-03-05 NOTE — ED INITIAL ASSESSMENT (HPI)
Aox4. Complaints of Left wrist laceration immediately PTA. States she cut on broken glass. UTD on tetanus. Reports pulsating with blood loss/arterial; applied coband at home. Also small lac left 4th digit-small amt of bleeding at this time.

## 2023-03-10 ENCOUNTER — NURSE TRIAGE (OUTPATIENT)
Dept: FAMILY MEDICINE CLINIC | Facility: CLINIC | Age: 53
End: 2023-03-10

## 2023-03-10 ENCOUNTER — OFFICE VISIT (OUTPATIENT)
Dept: INTERNAL MEDICINE CLINIC | Facility: CLINIC | Age: 53
End: 2023-03-10

## 2023-03-10 VITALS
OXYGEN SATURATION: 98 % | DIASTOLIC BLOOD PRESSURE: 81 MMHG | BODY MASS INDEX: 34.27 KG/M2 | WEIGHT: 170 LBS | HEIGHT: 59 IN | SYSTOLIC BLOOD PRESSURE: 122 MMHG | HEART RATE: 73 BPM | TEMPERATURE: 98 F

## 2023-03-10 DIAGNOSIS — S61.512D LACERATION OF LEFT WRIST, SUBSEQUENT ENCOUNTER: Primary | ICD-10-CM

## 2023-03-10 PROCEDURE — 3008F BODY MASS INDEX DOCD: CPT | Performed by: INTERNAL MEDICINE

## 2023-03-10 PROCEDURE — 3074F SYST BP LT 130 MM HG: CPT | Performed by: INTERNAL MEDICINE

## 2023-03-10 PROCEDURE — 3079F DIAST BP 80-89 MM HG: CPT | Performed by: INTERNAL MEDICINE

## 2023-03-10 PROCEDURE — 99213 OFFICE O/P EST LOW 20 MIN: CPT | Performed by: INTERNAL MEDICINE

## 2023-03-10 RX ORDER — FLUTICASONE PROPIONATE 50 MCG
2 SPRAY, SUSPENSION (ML) NASAL DAILY
Qty: 3 EACH | Refills: 3 | Status: SHIPPED | OUTPATIENT
Start: 2023-03-10 | End: 2024-03-04

## 2023-03-10 RX ORDER — SULFAMETHOXAZOLE AND TRIMETHOPRIM 800; 160 MG/1; MG/1
1 TABLET ORAL 2 TIMES DAILY
Qty: 14 TABLET | Refills: 0 | Status: SHIPPED | OUTPATIENT
Start: 2023-03-10 | End: 2023-03-17

## 2023-03-10 NOTE — TELEPHONE ENCOUNTER
Incoming rx fax  90 day    FLUTICASONE PROP 50 MCG spray  QTY 48  Spray 2 sprays into each nostril every day

## 2023-03-10 NOTE — TELEPHONE ENCOUNTER
Please review; protocol failed. Or has no protocol    Requested Prescriptions   Pending Prescriptions Disp Refills    fluticasone propionate 50 MCG/ACT Nasal Suspension 3 each 3     Si sprays by Each Nare route daily.        Allergy Medication Protocol Passed - 3/10/2023  1:43 PM        Passed - In person appointment or virtual visit in the past 12 mos or appointment in next 3 mos     Recent Outpatient Visits              1 week ago Mild protein-calorie malnutrition ([address]    [facility], [address], Angelica Horton MD    Office Visit    1 month ago Personal history of exposure to potentially hazardous body fluids    [address]    Office Visit    2 months ago Personal history of exposure to potentially hazardous body fluids    [address]    Office Visit    2 months ago Acute cough    [facility], [address], [place], [place]    Office Visit    3 months ago Abnormal EKG    [facility], [address], [place], [place]    Office Visit          Future Appointments         Provider Department Appt Notes    In 3 months Yina Allen MD [facility], [address], [place] F/u    In 3 months Gale Hardy MD [facility], [address], [address] Visits              1 week ago Mild protein-calorie malnutrition [facility])    Ranjeet Fraga MD    Office Visit    1 month ago Personal history of exposure to potentially hazardous body fluids    [address]    Office Visit    2 months ago Personal history of exposure to potentially hazardous body fluids    [address]    Office Visit    2 months ago Acute cough    [facility], [address], [place], [place]    Office Visit    3 months ago Abnormal EKG    [facility], [place], [place]    Office Visit           Future Appointments         Provider Department Appt Notes    In 3 months MD Jalyn Hernandez, [address] [facility] [address], [facility] F/u    In 3 months MD Jalyn Lux, [address], [facility]

## 2023-03-14 ENCOUNTER — TELEPHONE (OUTPATIENT)
Dept: FAMILY MEDICINE CLINIC | Facility: CLINIC | Age: 53
End: 2023-03-14

## 2023-03-14 ENCOUNTER — OFFICE VISIT (OUTPATIENT)
Dept: FAMILY MEDICINE CLINIC | Facility: CLINIC | Age: 53
End: 2023-03-14

## 2023-03-14 VITALS
BODY MASS INDEX: 33.87 KG/M2 | HEART RATE: 87 BPM | WEIGHT: 168 LBS | DIASTOLIC BLOOD PRESSURE: 85 MMHG | SYSTOLIC BLOOD PRESSURE: 126 MMHG | HEIGHT: 59 IN

## 2023-03-14 DIAGNOSIS — S61.502D OPEN WOUND OF LEFT WRIST, SUBSEQUENT ENCOUNTER: Primary | ICD-10-CM

## 2023-03-14 PROCEDURE — 3074F SYST BP LT 130 MM HG: CPT | Performed by: FAMILY MEDICINE

## 2023-03-14 PROCEDURE — 3079F DIAST BP 80-89 MM HG: CPT | Performed by: FAMILY MEDICINE

## 2023-03-14 PROCEDURE — 99213 OFFICE O/P EST LOW 20 MIN: CPT | Performed by: FAMILY MEDICINE

## 2023-03-14 PROCEDURE — 3008F BODY MASS INDEX DOCD: CPT | Performed by: FAMILY MEDICINE

## 2023-03-14 NOTE — TELEPHONE ENCOUNTER
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 Emelyn Waldron can do suture placement     Future Appointments   Date Time Provider Vinny Mckay   3/14/2023  4:00 PM [facility]   2023  3:00 PM MD Edla Bryant   2023  3:30 PM Gregory Schultz MD [facility]     Informed mask is required for building entry and appointment. Patient verbalizes understanding and agrees with plan.

## 2023-03-14 NOTE — PROGRESS NOTES
Blood pressure 126/85, pulse 87, height 4' 11\" (1.499 m), weight 168 lb (76.2 kg). Left wrist laceration was sutured shut for 7 days then it opened.     Some discomfort difficulty with healing    Objective left wrist with 1.7 cm laceration no signs of secondary infection no tenderness granulation tissue noted healing    Assessment left wrist wound    Plan heal by secondary intention mupirocin    Follow-up with hand specialty if no improvement

## 2023-03-22 ENCOUNTER — TELEPHONE (OUTPATIENT)
Dept: ORTHOPEDICS CLINIC | Facility: CLINIC | Age: 53
End: 2023-03-22

## 2023-03-22 DIAGNOSIS — M79.671 RIGHT FOOT PAIN: Primary | ICD-10-CM

## 2023-03-22 DIAGNOSIS — M79.672 LEFT FOOT PAIN: ICD-10-CM

## 2023-03-27 ENCOUNTER — HOSPITAL ENCOUNTER (OUTPATIENT)
Dept: GENERAL RADIOLOGY | Facility: HOSPITAL | Age: 53
Discharge: HOME | End: 2023-03-27
Attending: PODIATRIST
Payer: COMMERCIAL

## 2023-03-27 DIAGNOSIS — M79.671 RIGHT FOOT PAIN: ICD-10-CM

## 2023-03-27 DIAGNOSIS — M79.672 LEFT FOOT PAIN: ICD-10-CM

## 2023-03-27 PROCEDURE — 73630 X-RAY EXAM OF FOOT: CPT | Performed by: PODIATRIST

## 2023-04-03 ENCOUNTER — TELEPHONE (OUTPATIENT)
Dept: PODIATRY CLINIC | Facility: CLINIC | Age: 53
End: 2023-04-03

## 2023-04-03 NOTE — TELEPHONE ENCOUNTER
Per pt states Dr. Yony Steinberg agreed to see her on 4/11 to look at her foot, no appointments, refused appt 4/10.  Please advise

## 2023-04-03 NOTE — TELEPHONE ENCOUNTER
Dr. Fredy Salter please see below and advise looks like patient has appt scheduled with Dr. Jorge Mcmahon 4/11/23 at 9:15AM.

## 2023-04-06 NOTE — TELEPHONE ENCOUNTER
5.  Said that her Joslynla Shirae try to get her in so we do not have a disappointed patient thank you

## 2023-04-11 ENCOUNTER — TELEPHONE (OUTPATIENT)
Dept: PODIATRY CLINIC | Facility: CLINIC | Age: 53
End: 2023-04-11

## 2023-04-11 ENCOUNTER — OFFICE VISIT (OUTPATIENT)
Dept: PODIATRY CLINIC | Facility: CLINIC | Age: 53
End: 2023-04-11

## 2023-04-11 DIAGNOSIS — M79.675 PAIN IN TOES OF BOTH FEET: ICD-10-CM

## 2023-04-11 DIAGNOSIS — M79.671 RIGHT FOOT PAIN: ICD-10-CM

## 2023-04-11 DIAGNOSIS — M20.41 ACQUIRED HAMMERTOE OF RIGHT FOOT: ICD-10-CM

## 2023-04-11 DIAGNOSIS — M20.41 HAMMER TOES OF BOTH FEET: Primary | ICD-10-CM

## 2023-04-11 DIAGNOSIS — M20.42 HAMMER TOES OF BOTH FEET: Primary | ICD-10-CM

## 2023-04-11 DIAGNOSIS — M79.674 PAIN IN TOES OF BOTH FEET: ICD-10-CM

## 2023-04-11 DIAGNOSIS — E66.9 OBESITY (BMI 30-39.9): Primary | ICD-10-CM

## 2023-04-11 PROCEDURE — 99213 OFFICE O/P EST LOW 20 MIN: CPT | Performed by: PODIATRIST

## 2023-04-11 NOTE — TELEPHONE ENCOUNTER
Procedure: Fusion of the PIP joint with K wire fixation second toe right foot, arthroplasty fifth toe bilateral  CPT code: 83572  Length of Surgery: 45 minutes  Any Instruments: Mini power, mini fluoroscopy, K wires  Call patient: within 24 hours  Anesthesia: MAC  Location: [facility]  Assistance: none  Pacemaker: No  Anticoagulants: No  Nickel Allergy: No  Latex Allergy: No  Diagnosis/ICD Code:   (M20.41,  M20.42) Hammer toes of both feet  (primary encounter diagnosis)  Plan:     (M79.674,  M79.675) Pain in toes of both feet  Plan:     (M20.41) Acquired hammertoe of right foot  Plan:

## 2023-04-12 ENCOUNTER — PATIENT MESSAGE (OUTPATIENT)
Dept: PODIATRY CLINIC | Facility: CLINIC | Age: 53
End: 2023-04-12

## 2023-04-13 NOTE — TELEPHONE ENCOUNTER
Patient called back and is now requesting surgery be scheduled on 9/6/23 which was done this date at [facility]. Patient was told I would call her back to review all details once confirmed by [facility] likely in July. She voiced understanding.

## 2023-04-13 NOTE — TELEPHONE ENCOUNTER
Called patient this date and phone rang and then went busy signal.  Send a My chart message indicating what June dates Dr. Willard Neely has available based on her request in her [facility]. She was instructed to call me back directly at [phone] to schedule.

## 2023-05-12 DIAGNOSIS — E66.9 OBESITY (BMI 30-39.9): ICD-10-CM

## 2023-06-02 DIAGNOSIS — E66.9 OBESITY (BMI 30-39.9): ICD-10-CM

## 2023-06-06 DIAGNOSIS — E66.9 OBESITY (BMI 30-39.9): Primary | ICD-10-CM

## 2023-06-17 ENCOUNTER — LAB ENCOUNTER (OUTPATIENT)
Dept: LAB | Age: 53
End: 2023-06-17
Attending: PHYSICIAN ASSISTANT
Payer: COMMERCIAL

## 2023-06-17 ENCOUNTER — OFFICE VISIT (OUTPATIENT)
Dept: FAMILY MEDICINE CLINIC | Facility: CLINIC | Age: 53
End: 2023-06-17

## 2023-06-17 VITALS
DIASTOLIC BLOOD PRESSURE: 84 MMHG | SYSTOLIC BLOOD PRESSURE: 120 MMHG | BODY MASS INDEX: 29.43 KG/M2 | WEIGHT: 146 LBS | HEART RATE: 76 BPM | HEIGHT: 59 IN

## 2023-06-17 DIAGNOSIS — E53.8 VITAMIN B 12 DEFICIENCY: ICD-10-CM

## 2023-06-17 DIAGNOSIS — R73.03 PREDIABETES: ICD-10-CM

## 2023-06-17 DIAGNOSIS — Z00.00 ROUTINE GENERAL MEDICAL EXAMINATION AT A HEALTH CARE FACILITY: ICD-10-CM

## 2023-06-17 DIAGNOSIS — Z00.00 ROUTINE GENERAL MEDICAL EXAMINATION AT A HEALTH CARE FACILITY: Primary | ICD-10-CM

## 2023-06-17 DIAGNOSIS — Z12.31 ENCOUNTER FOR SCREENING MAMMOGRAM FOR BREAST CANCER: ICD-10-CM

## 2023-06-17 LAB
ALBUMIN SERPL-MCNC: 3.7 G/DL (ref 3.4–5)
ALBUMIN/GLOB SERPL: 1.1 {RATIO} (ref 1–2)
ALP LIVER SERPL-CCNC: 103 U/L
ALT SERPL-CCNC: 30 U/L
ANION GAP SERPL CALC-SCNC: 5 MMOL/L (ref 0–18)
AST SERPL-CCNC: 27 U/L (ref 15–37)
BASOPHILS # BLD AUTO: 0.04 X10(3) UL (ref 0–0.2)
BASOPHILS NFR BLD AUTO: 0.7 %
BILIRUB SERPL-MCNC: 0.4 MG/DL (ref 0.1–2)
BUN BLD-MCNC: 26 MG/DL (ref 7–18)
BUN/CREAT SERPL: 32.5 (ref 10–20)
CALCIUM BLD-MCNC: 9.7 MG/DL (ref 8.5–10.1)
CHLORIDE SERPL-SCNC: 104 MMOL/L (ref 98–112)
CHOLEST SERPL-MCNC: 191 MG/DL (ref ?–200)
CO2 SERPL-SCNC: 31 MMOL/L (ref 21–32)
CREAT BLD-MCNC: 0.8 MG/DL
DEPRECATED RDW RBC AUTO: 40.3 FL (ref 35.1–46.3)
EOSINOPHIL # BLD AUTO: 0.15 X10(3) UL (ref 0–0.7)
EOSINOPHIL NFR BLD AUTO: 2.5 %
ERYTHROCYTE [DISTWIDTH] IN BLOOD BY AUTOMATED COUNT: 13.3 % (ref 11–15)
EST. AVERAGE GLUCOSE BLD GHB EST-MCNC: 117 MG/DL (ref 68–126)
FASTING PATIENT LIPID ANSWER: YES
FASTING STATUS PATIENT QL REPORTED: YES
GFR SERPLBLD BASED ON 1.73 SQ M-ARVRAT: 88 ML/MIN/1.73M2 (ref 60–?)
GLOBULIN PLAS-MCNC: 3.4 G/DL (ref 2.8–4.4)
GLUCOSE BLD-MCNC: 96 MG/DL (ref 70–99)
HBA1C MFR BLD: 5.7 % (ref ?–5.7)
HCT VFR BLD AUTO: 41.6 %
HDLC SERPL-MCNC: 69 MG/DL (ref 40–59)
HGB BLD-MCNC: 13.8 G/DL
IMM GRANULOCYTES # BLD AUTO: 0.02 X10(3) UL (ref 0–1)
IMM GRANULOCYTES NFR BLD: 0.3 %
LDLC SERPL CALC-MCNC: 110 MG/DL (ref ?–100)
LYMPHOCYTES # BLD AUTO: 2.15 X10(3) UL (ref 1–4)
LYMPHOCYTES NFR BLD AUTO: 35.3 %
MCH RBC QN AUTO: 27.7 PG (ref 26–34)
MCHC RBC AUTO-ENTMCNC: 33.2 G/DL (ref 31–37)
MCV RBC AUTO: 83.4 FL
MONOCYTES # BLD AUTO: 0.48 X10(3) UL (ref 0.1–1)
MONOCYTES NFR BLD AUTO: 7.9 %
NEUTROPHILS # BLD AUTO: 3.25 X10 (3) UL (ref 1.5–7.7)
NEUTROPHILS # BLD AUTO: 3.25 X10(3) UL (ref 1.5–7.7)
NEUTROPHILS NFR BLD AUTO: 53.3 %
NONHDLC SERPL-MCNC: 122 MG/DL (ref ?–130)
OSMOLALITY SERPL CALC.SUM OF ELEC: 295 MOSM/KG (ref 275–295)
PLATELET # BLD AUTO: 335 10(3)UL (ref 150–450)
POTASSIUM SERPL-SCNC: 3.5 MMOL/L (ref 3.5–5.1)
PROT SERPL-MCNC: 7.1 G/DL (ref 6.4–8.2)
RBC # BLD AUTO: 4.99 X10(6)UL
SODIUM SERPL-SCNC: 140 MMOL/L (ref 136–145)
TRIGL SERPL-MCNC: 63 MG/DL (ref 30–149)
TSI SER-ACNC: 0.6 MIU/ML (ref 0.36–3.74)
VIT B12 SERPL-MCNC: 434 PG/ML (ref 193–986)
VLDLC SERPL CALC-MCNC: 11 MG/DL (ref 0–30)
WBC # BLD AUTO: 6.1 X10(3) UL (ref 4–11)

## 2023-06-17 PROCEDURE — 83036 HEMOGLOBIN GLYCOSYLATED A1C: CPT

## 2023-06-17 PROCEDURE — 90471 IMMUNIZATION ADMIN: CPT | Performed by: PHYSICIAN ASSISTANT

## 2023-06-17 PROCEDURE — 82607 VITAMIN B-12: CPT

## 2023-06-17 PROCEDURE — 3074F SYST BP LT 130 MM HG: CPT | Performed by: PHYSICIAN ASSISTANT

## 2023-06-17 PROCEDURE — 80053 COMPREHEN METABOLIC PANEL: CPT

## 2023-06-17 PROCEDURE — 99396 PREV VISIT EST AGE 40-64: CPT | Performed by: PHYSICIAN ASSISTANT

## 2023-06-17 PROCEDURE — 90677 PCV20 VACCINE IM: CPT | Performed by: PHYSICIAN ASSISTANT

## 2023-06-17 PROCEDURE — 84443 ASSAY THYROID STIM HORMONE: CPT

## 2023-06-17 PROCEDURE — 80061 LIPID PANEL: CPT

## 2023-06-17 PROCEDURE — 36415 COLL VENOUS BLD VENIPUNCTURE: CPT

## 2023-06-17 PROCEDURE — 3008F BODY MASS INDEX DOCD: CPT | Performed by: PHYSICIAN ASSISTANT

## 2023-06-17 PROCEDURE — 85025 COMPLETE CBC W/AUTO DIFF WBC: CPT

## 2023-06-17 PROCEDURE — 3079F DIAST BP 80-89 MM HG: CPT | Performed by: PHYSICIAN ASSISTANT

## 2023-06-27 ENCOUNTER — OFFICE VISIT (OUTPATIENT)
Dept: SURGERY | Facility: CLINIC | Age: 53
End: 2023-06-27
Payer: COMMERCIAL

## 2023-06-27 VITALS
DIASTOLIC BLOOD PRESSURE: 70 MMHG | HEIGHT: 58.9 IN | BODY MASS INDEX: 29.23 KG/M2 | HEART RATE: 79 BPM | OXYGEN SATURATION: 98 % | SYSTOLIC BLOOD PRESSURE: 108 MMHG | WEIGHT: 145 LBS

## 2023-06-27 DIAGNOSIS — E78.5 DYSLIPIDEMIA: ICD-10-CM

## 2023-06-27 DIAGNOSIS — E66.3 OVERWEIGHT (BMI 25.0-29.9): ICD-10-CM

## 2023-06-27 DIAGNOSIS — I10 HTN (HYPERTENSION), BENIGN: ICD-10-CM

## 2023-06-27 DIAGNOSIS — E44.1 MILD PROTEIN-CALORIE MALNUTRITION (HCC): Primary | ICD-10-CM

## 2023-06-27 DIAGNOSIS — R73.03 PRE-DIABETES: ICD-10-CM

## 2023-06-27 PROCEDURE — 3074F SYST BP LT 130 MM HG: CPT | Performed by: INTERNAL MEDICINE

## 2023-06-27 PROCEDURE — 99214 OFFICE O/P EST MOD 30 MIN: CPT | Performed by: INTERNAL MEDICINE

## 2023-06-27 PROCEDURE — 3008F BODY MASS INDEX DOCD: CPT | Performed by: INTERNAL MEDICINE

## 2023-06-27 PROCEDURE — 3078F DIAST BP <80 MM HG: CPT | Performed by: INTERNAL MEDICINE

## 2023-07-07 ENCOUNTER — OFFICE VISIT (OUTPATIENT)
Dept: OBGYN CLINIC | Facility: CLINIC | Age: 53
End: 2023-07-07

## 2023-07-07 VITALS
WEIGHT: 144 LBS | DIASTOLIC BLOOD PRESSURE: 74 MMHG | HEART RATE: 88 BPM | BODY MASS INDEX: 29 KG/M2 | SYSTOLIC BLOOD PRESSURE: 109 MMHG

## 2023-07-07 DIAGNOSIS — Z12.4 SCREENING FOR MALIGNANT NEOPLASM OF CERVIX: ICD-10-CM

## 2023-07-07 DIAGNOSIS — N95.1 HOT FLASHES DUE TO MENOPAUSE: ICD-10-CM

## 2023-07-07 DIAGNOSIS — Z01.419 WELL WOMAN EXAM: Primary | ICD-10-CM

## 2023-07-07 PROCEDURE — 3074F SYST BP LT 130 MM HG: CPT | Performed by: OBSTETRICS & GYNECOLOGY

## 2023-07-07 PROCEDURE — 3078F DIAST BP <80 MM HG: CPT | Performed by: OBSTETRICS & GYNECOLOGY

## 2023-07-07 PROCEDURE — 99396 PREV VISIT EST AGE 40-64: CPT | Performed by: OBSTETRICS & GYNECOLOGY

## 2023-07-10 LAB — HPV I/H RISK 1 DNA SPEC QL NAA+PROBE: POSITIVE

## 2023-07-18 LAB
HPV16 DNA CVX QL PROBE+SIG AMP: POSITIVE
HPV18 DNA CVX QL PROBE+SIG AMP: NEGATIVE

## 2023-07-29 NOTE — H&P
HPI:  The patient is a 46 yo F here for WWE. C/o hot flashes. Menopause 4 years ago. No PMB. Not sexually active. On 3 BP meds and prediabetic. Taking wagovye and has lost wt. Feels well otherwise. No LMP recorded. Patient is postmenopausal.      Latest Ref Rng & Units 2022     4:26 PM   RECENT PAP RESULTS   Thinprep Pap  Negative for intraepithelial lesion or malignancy.     HPV Negative Positive         Pap Date: 22  Pap Result Notes: PAP NEG HPV Positive  Follow Up Recommendation: Annual 22 BRANDI    Mammo: scheduled      Depression Screening (PHQ-2/PHQ-9): Over the LAST 2 WEEKS   Little interest or pleasure in doing things (over the last two weeks)?: Not at all    Feeling down, depressed, or hopeless (over the last two weeks)?: Not at all    PHQ-2 SCORE: 0          Reviewed medical and surgical history below       OBSTETRICS HISTORY:  OB History     T4    L4    SAB0  IAB0  Ectopic0  Multiple0  Live Births4     GYNE HISTORY:    Sexual activity:   Not on file            MEDICAL HISTORY:  Past Medical History:   Diagnosis Date    Bleeding ulcer     Dumping syndrome     H/O gastric bypass 2006    for weight loss    H/O laparoscopic adjustable gastric banding 2003    removed     History of blood transfusion     Hypertension     Obesity (BMI 30-39.9)     KRISTY on CPAP     PONV (postoperative nausea and vomiting)     Screen for colon cancer     repeat CLN in     Sleep apnea     cpap       SURGICAL HISTORY:  Past Surgical History:   Procedure Laterality Date    ADJUSTMENT GASTRIC BAND       DELIVERY ONLY      CHOLECYSTECTOMY      COLONOSCOPY      COLONOSCOPY N/A 2022    Procedure: COLONOSCOPY;  Surgeon: Annmarie Jacobsen MD;  Location: [address] [place] [address] ENDOSCOPY    GASTRIC BYPASS,OBESITY,SB [address] [place] [address]      LAP GASTRIC BYPASS/SHAYLA-EN-Y  2003    [place] surgical weight 225    REDUCTION LEFT Left 1986    REDUCTION OF LARGE BREAST Bilateral     REDUCTION RIGHT Right 1986    REMOVAL OF OVARY(S) Right        SOCIAL HISTORY:  Social History    Socioeconomic History      Marital status:       Spouse name: Not on file      Number of children: Not on file      Years of education: Not on file      Highest education level: Not on file    Occupational History      Not on file    Tobacco Use      Smoking status: Light Smoker      Smokeless tobacco: Never      Tobacco comments: 1 PACK A WEEK    Vaping Use      Vaping Use: Never used    Substance and Sexual Activity      Alcohol use: Yes      Drug use: Never      Sexual activity: Not on file    Other Topics      Concerns:        Not on file    Social History Narrative      Not on file    Social Determinants of Health  Financial Resource Strain: Not on file  Food Insecurity: Not on file  Transportation Needs: Not on file  Physical Activity: Not on file  Stress: Not on file  Social Connections: Not on file  Housing Stability: Not on file    FAMILY HISTORY:  Family History   Problem Relation Age of Onset    PTSD Mother     Suicide History Maternal Grandmother         completd suicide    Suicide History Paternal Grandfather         completed suicide    Alcohol and Other Disorders Associated Sister     Substance Abuse Sister     Depression Sister     Anxiety Sister     Alcohol and Other Disorders Associated Brother     Depression Brother     Anxiety Brother     Alcohol and Other Disorders Associated Sister     Anxiety Sister     Depression Sister     Learning Disability Sister     Cancer Paternal Grandmother         colon cancer    Breast Cancer Neg     Ovarian Cancer Neg        MEDICATIONS:    Current Outpatient Medications:     semaglutide-weight management 1.7 MG/0.75ML Subcutaneous Solution Auto-injector, Inject 0.75 mL (1.7 mg total) into the skin once a week., Disp: 9 mL, Rfl: 1    losartan 50 MG Oral Tab, Take 1 tablet (50 mg total) by mouth daily. , Disp: 90 tablet, Rfl: 3    hydroCHLOROthiazide 25 MG Oral Tab, Take 1 tablet (25 mg total) by mouth daily. , Disp: 90 tablet, Rfl: 3    metoprolol succinate ER 25 MG Oral Tablet 24 Hr, Take 1 tablet (25 mg total) by mouth daily. , Disp: 90 tablet, Rfl: 3    HYDROXYZINE 50 MG Oral Tab, TAKE 1 TABLET BY MOUTH THREE TIMES A DAY AS NEEDED, Disp: 270 tablet, Rfl: 0    sertraline (ZOLOFT) 100 MG Oral Tab, Take 2 tablets (200 mg total) by mouth every morning., Disp: 180 tablet, Rfl: 0    buPROPion SR (WELLBUTRIN SR) 150 MG Oral Tablet 12 Hr, Take 1 tablet (150 mg total) by mouth 2 (two) times daily. , Disp: 180 tablet, Rfl: 0    cetirizine 10 MG Oral Tab, Take 1 tablet (10 mg total) by mouth daily. , Disp: 90 tablet, Rfl: 3    ALLERGIES:  No Known Allergies      Review of Systems:  Constitutional:  Denies fevers and chills   Cardiovascular:  denies chest pain or palpitations  Respiratory:  denies shortness of breath  Gastrointestinal:  denies heartburn, abdominal pain, diarrhea or constipation  Genitourinary:  denies dysuria, incontinence, abnormal vaginal discharge, vaginal itching  Musculoskeletal:  denies back pain. Skin/Breast:  Denies any breast pain, lumps, or discharge. Neurological:  denies headaches, extremity weakness  Psychiatric: denies depression or anxiety.        07/07/23  1539   BP: 109/74   Pulse: 88       PHYSICAL EXAM:   Constitutional: well developed, well nourished  Head/Face: normocephalic  Neck/Thyroid: thyroid symmetric, no thyromegaly, no nodules, no adenopathy  Heart: Regular rate and rhythm   Lungs: clear to ascultation bilaterally   Lymphatic:no abnormal supraclavicular or axillary adenopathy is noted  Breast: normal without palpable masses, tenderness, asymmetry, nipple discharge, nipple retraction or skin changes  Abdomen:  soft, nontender, nondistended, no masses  Skin/Hair: no unusual rashes or bruises  Extremities: no edema, no cyanosis  Psychiatric: Appropriate mood and affect    Pelvic Exam:  External Genitalia: normal appearance, hair distribution, and no lesions  Urethral Meatus:  normal in size, location, without lesions and prolapse  Bladder:  No fullness, masses or tenderness  Vagina:  Normal appearance without lesions, no abnormal discharge  Cervix:  Normal without tenderness on motion  Uterus: normal in size, contour, position, mobility, without tenderness  Adnexa: normal without masses or tenderness  Perineum: normal    Assessment/Plan:  Dionne Hills was seen today for physical.    Diagnoses and all orders for this visit:    Well woman exam    Screening for malignant neoplasm of cervix  -     THINPREP PAP SMEAR B; Future  -     HPV HIGH RISK , THIN PREP COLLECTION; Future  -     HPV HIGH RISK , THIN PREP COLLECTION  -     THINPREP PAP SMEAR B    Hot flashes due to menopause        WWE:   Reviewed ASCCP guidelines with the patient -- Pap/hpv today  Hot flashes: would not recommend HRT with prediabetes and HTN mgmt with 3 BP meds (bp stable today). Would recommend phytoestrogens, black cohosh or consider effexor. Pt already on zoloft.   Discussed talking to PCP about switching zoloft to effexor 75 or 150mg  if reasonable  Breast Health:     Mammo scheduled  Encouraged monthly self breast exams with the patient   Discussed diet, exercise, MVIs with Vit D  Follow up in 1 yr for [facility] clear

## 2023-08-07 ENCOUNTER — OFFICE VISIT (OUTPATIENT)
Dept: FAMILY MEDICINE CLINIC | Facility: CLINIC | Age: 53
End: 2023-08-07

## 2023-08-07 ENCOUNTER — PATIENT MESSAGE (OUTPATIENT)
Dept: PODIATRY CLINIC | Facility: CLINIC | Age: 53
End: 2023-08-07

## 2023-08-07 VITALS
HEIGHT: 59 IN | BODY MASS INDEX: 28.63 KG/M2 | WEIGHT: 142 LBS | DIASTOLIC BLOOD PRESSURE: 78 MMHG | HEART RATE: 92 BPM | SYSTOLIC BLOOD PRESSURE: 109 MMHG

## 2023-08-07 DIAGNOSIS — M20.41 HAMMER TOE OF RIGHT FOOT: Primary | ICD-10-CM

## 2023-08-07 DIAGNOSIS — S03.40XA TMJ (SPRAIN OF TEMPOROMANDIBULAR JOINT), INITIAL ENCOUNTER: ICD-10-CM

## 2023-08-07 DIAGNOSIS — H91.93 BILATERAL HEARING LOSS, UNSPECIFIED HEARING LOSS TYPE: Primary | ICD-10-CM

## 2023-08-07 PROCEDURE — 3074F SYST BP LT 130 MM HG: CPT | Performed by: FAMILY MEDICINE

## 2023-08-07 PROCEDURE — 3008F BODY MASS INDEX DOCD: CPT | Performed by: FAMILY MEDICINE

## 2023-08-07 PROCEDURE — 3078F DIAST BP <80 MM HG: CPT | Performed by: FAMILY MEDICINE

## 2023-08-07 PROCEDURE — 99214 OFFICE O/P EST MOD 30 MIN: CPT | Performed by: FAMILY MEDICINE

## 2023-08-07 RX ORDER — PREDNISONE 10 MG/1
10 TABLET ORAL 3 TIMES DAILY
Qty: 15 TABLET | Refills: 0 | Status: SHIPPED | OUTPATIENT
Start: 2023-08-07

## 2023-08-07 RX ORDER — AZELASTINE 1 MG/ML
2 SPRAY, METERED NASAL 2 TIMES DAILY
Qty: 1 EACH | Refills: 3 | Status: SHIPPED | OUTPATIENT
Start: 2023-08-07

## 2023-08-08 DIAGNOSIS — E66.9 OBESITY (BMI 30-39.9): Primary | ICD-10-CM

## 2023-08-08 NOTE — TELEPHONE ENCOUNTER
Called patient this date and confirmed she wants to cancel 9/6/23 which was done this date thru Surgical case change. I told her I am not sure who she spoke with previously as there is no Mariam Munguia in podiatry surgery scheduling. She will call back when ready to proceed.

## 2023-08-08 NOTE — PROGRESS NOTES
Blood pressure 109/78, pulse 92, height 4' 11\" (1.499 m), weight 142 lb (64.4 kg). Presents today complaining of chronic nasal congestion. Also with severe headache that began this morning. She drinks 4 cups of coffee a day no vomiting some nausea. History of gastric bypass surgery. She grinds her teeth at night. She does not chew gum. Clear nasal congestion. Denies bad breath or tooth pain.     Objective      CN II-XII GROSSLY INTACT MUSCLE STRENGTH +5/5 UPPER AND LOWER EXTREMITIES B/L DTR'S UPPER AND LOWER +2/4 UPPER AND LOWER EXTREMITIES B/L NEG PRONATOR DRIFT NEG BABINSKI NO CEREBELLAR SIGNS VISUAL FIELDS INTACT    Marked tenderness noted of TMJ and masseters bilaterally    Assessment #1 headache likely TMJ related patient without migraine history #2 possible caffeine induced headache #3 vasomotor rhinitis    Plan #1 prednisone prescription TMJ information given soft diet #2 gradually decrease caffeine #3 Astelin nasal spray    Follow-up if no improvement

## 2023-08-09 ENCOUNTER — HOSPITAL ENCOUNTER (OUTPATIENT)
Dept: MAMMOGRAPHY | Facility: HOSPITAL | Age: 53
Discharge: HOME | End: 2023-08-09
Attending: PHYSICIAN ASSISTANT
Payer: COMMERCIAL

## 2023-08-09 DIAGNOSIS — Z12.31 ENCOUNTER FOR SCREENING MAMMOGRAM FOR BREAST CANCER: ICD-10-CM

## 2023-08-09 PROCEDURE — 77067 SCR MAMMO BI INCL CAD: CPT | Performed by: PHYSICIAN ASSISTANT

## 2023-08-09 PROCEDURE — 77063 BREAST TOMOSYNTHESIS BI: CPT | Performed by: PHYSICIAN ASSISTANT

## 2023-08-15 ENCOUNTER — OFFICE VISIT (OUTPATIENT)
Dept: OBGYN CLINIC | Facility: CLINIC | Age: 53
End: 2023-08-15

## 2023-08-15 VITALS — SYSTOLIC BLOOD PRESSURE: 115 MMHG | DIASTOLIC BLOOD PRESSURE: 77 MMHG | HEART RATE: 80 BPM

## 2023-08-15 DIAGNOSIS — R87.810 CERVICAL HIGH RISK HUMAN PAPILLOMAVIRUS (HPV) DNA TEST POSITIVE: Primary | ICD-10-CM

## 2023-08-15 PROCEDURE — 57456 ENDOCERV CURETTAGE W/SCOPE: CPT | Performed by: OBSTETRICS & GYNECOLOGY

## 2023-08-15 PROCEDURE — 3074F SYST BP LT 130 MM HG: CPT | Performed by: OBSTETRICS & GYNECOLOGY

## 2023-08-15 PROCEDURE — 3078F DIAST BP <80 MM HG: CPT | Performed by: OBSTETRICS & GYNECOLOGY

## 2023-08-15 NOTE — PROCEDURES
Colpo with Endocervical Curettage    Post menpopausal female    Consent signed. Procedure discussed with patient in detail including indication, risk, benefits, alternatives and complications. Indication: HPV+ with h/o abn paps    Procedure: The patient was placed in the dorsal lithotomy position. Dolliver speculum was placed in the vagina. Cervix prepped with: Acetic acid  Lugol  Under colposcopic examination the transition zone was retracted. Endocervix was curetted using a Kervorkian curette. Monsel's solution was applied. Specimen sent to pathology. Patient tolerated procedure well. Discharge instructions of pelvic rest & no swimming x one week.       Findings:  Retracted TZ; ECC only

## 2023-08-30 ENCOUNTER — PATIENT MESSAGE (OUTPATIENT)
Dept: OBGYN CLINIC | Facility: CLINIC | Age: 53
End: 2023-08-30

## 2023-08-30 NOTE — TELEPHONE ENCOUNTER
[address] for the delay in getting the results to you. I have to leave the office early today but will call you tomorrow to discuss plan of care. 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 I'll call tomorrow to discuss if you're aviailable. I'll be in office 1-7 so I'll try to call in AM if that's okay with your schedule. Let me know? DR SHEIKH   Written by Jennifer Kay DO on 8/30/2023 10:58 AM CDT  Seen by patient Marquita Hernandez on 8/30/2023 11:19 AM        Message to Adeola Sweeney as OBDULIOI with pts response to your my chart.

## 2023-09-06 ENCOUNTER — TELEPHONE (OUTPATIENT)
Dept: OBGYN CLINIC | Facility: CLINIC | Age: 53
End: 2023-09-06

## 2023-09-06 NOTE — TELEPHONE ENCOUNTER
Pt returned Dr. Jany Jacobsen call of this morning. Please call back at [phone], son Nicholas,'s phone. Pt phone does not ring.

## 2023-09-06 NOTE — TELEPHONE ENCOUNTER
To BRANDI to please advise if PAP and Colpo can be completed on same day or do you need separate appts. Thank you.

## 2023-09-06 NOTE — TELEPHONE ENCOUNTER
Spoke to pt. Reviewed options of surveillance vs LEEP. R/b reviewed. Pt wants surveillance. Plan for pap and colpo in 6 months from last.  Please call and help schedule.

## 2023-10-10 NOTE — TELEPHONE ENCOUNTER
Attempted to call patient phone rang no voicemail set up. Post-Care Instructions: I reviewed with the patient in detail post-care instructions. Patient is to wear sunprotection, and avoid picking at any of the treated lesions. Pt may apply Vaseline to crusted or scabbing areas. Render Post-Care Instructions In Note?: no Bill Insurance (You Assume Risk Of Denial Or Audit By Selecting Yes): Yes Consent: The patient's consent was obtained including but not limited to risks of crusting, scabbing, blistering, scarring, darker or lighter pigmentary change, recurrence, incomplete removal and infection. Detail Level: Detailed Anesthesia Volume In Cc: 2 Medical Necessity Clause: This procedure was medically necessary because the lesions that were treated were: Medical Necessity Information: It is in your best interest to select a reason for this procedure from the list below. All of these items fulfill various CMS LCD requirements except the new and changing color options. None None

## 2023-10-12 ENCOUNTER — OFFICE VISIT (OUTPATIENT)
Dept: AUDIOLOGY | Facility: CLINIC | Age: 53
End: 2023-10-12

## 2023-10-12 ENCOUNTER — OFFICE VISIT (OUTPATIENT)
Dept: OTOLARYNGOLOGY | Facility: CLINIC | Age: 53
End: 2023-10-12
Payer: COMMERCIAL

## 2023-10-12 VITALS — BODY MASS INDEX: 28.63 KG/M2 | WEIGHT: 142 LBS | TEMPERATURE: 98 F | HEIGHT: 59 IN

## 2023-10-12 DIAGNOSIS — H91.90 HEARING LOSS, UNSPECIFIED HEARING LOSS TYPE, UNSPECIFIED LATERALITY: Primary | ICD-10-CM

## 2023-10-12 DIAGNOSIS — H90.3 SENSORINEURAL HEARING LOSS, BILATERAL: Primary | ICD-10-CM

## 2023-10-12 DIAGNOSIS — H92.03 OTALGIA OF BOTH EARS: ICD-10-CM

## 2023-10-12 PROCEDURE — 99203 OFFICE O/P NEW LOW 30 MIN: CPT | Performed by: OTOLARYNGOLOGY

## 2023-10-12 PROCEDURE — 3008F BODY MASS INDEX DOCD: CPT | Performed by: OTOLARYNGOLOGY

## 2023-10-12 PROCEDURE — 92557 COMPREHENSIVE HEARING TEST: CPT | Performed by: AUDIOLOGIST

## 2023-10-12 PROCEDURE — 92567 TYMPANOMETRY: CPT | Performed by: AUDIOLOGIST

## 2023-10-12 RX ORDER — CYCLOBENZAPRINE HCL 5 MG
5 TABLET ORAL NIGHTLY
Qty: 30 TABLET | Refills: 1 | Status: SHIPPED | OUTPATIENT
Start: 2023-10-12

## 2023-10-12 RX ORDER — CELECOXIB 200 MG/1
200 CAPSULE ORAL DAILY PRN
Qty: 30 CAPSULE | Refills: 0 | Status: SHIPPED | OUTPATIENT
Start: 2023-10-12 | End: 2023-11-11

## 2023-10-12 RX ORDER — GLYCOPYRROLATE 1 MG/1
1 TABLET ORAL 3 TIMES DAILY
Qty: 90 TABLET | Refills: 1 | Status: SHIPPED | OUTPATIENT
Start: 2023-10-12

## 2023-11-03 DIAGNOSIS — E66.9 OBESITY (BMI 30-39.9): ICD-10-CM

## 2023-11-06 RX ORDER — SEMAGLUTIDE 2.4 MG/.75ML
2.4 INJECTION, SOLUTION SUBCUTANEOUS WEEKLY
Qty: 2 ML | Refills: 0 | Status: SHIPPED | OUTPATIENT
Start: 2023-11-06

## 2023-12-01 ENCOUNTER — PATIENT MESSAGE (OUTPATIENT)
Dept: OTOLARYNGOLOGY | Facility: CLINIC | Age: 53
End: 2023-12-01

## 2023-12-01 DIAGNOSIS — E66.9 OBESITY (BMI 30-39.9): ICD-10-CM

## 2023-12-01 RX ORDER — SEMAGLUTIDE 2.4 MG/.75ML
2.4 INJECTION, SOLUTION SUBCUTANEOUS WEEKLY
Qty: 9 ML | Refills: 0 | Status: SHIPPED | OUTPATIENT
Start: 2023-12-01 | End: 2024-02-29

## 2023-12-03 DIAGNOSIS — E66.9 OBESITY (BMI 30-39.9): ICD-10-CM

## 2023-12-04 RX ORDER — SEMAGLUTIDE 2.4 MG/.75ML
2.4 INJECTION, SOLUTION SUBCUTANEOUS WEEKLY
Qty: 9 ML | Refills: 0 | OUTPATIENT
Start: 2023-12-04 | End: 2024-03-03

## 2023-12-05 RX ORDER — GLYCOPYRROLATE 1 MG/1
1 TABLET ORAL 3 TIMES DAILY
Qty: 90 TABLET | Refills: 1 | Status: SHIPPED | OUTPATIENT
Start: 2023-12-05

## 2023-12-05 RX ORDER — CYCLOBENZAPRINE HCL 5 MG
5 TABLET ORAL NIGHTLY
Qty: 30 TABLET | Refills: 1 | Status: SHIPPED | OUTPATIENT
Start: 2023-12-05

## 2023-12-05 RX ORDER — CELECOXIB 200 MG/1
200 CAPSULE ORAL DAILY PRN
Qty: 30 CAPSULE | Refills: 0 | Status: SHIPPED | OUTPATIENT
Start: 2023-12-05

## 2023-12-05 NOTE — TELEPHONE ENCOUNTER
This refill request is being sent to the provider for the following reason:  []Patient has not had an appointment within the past 12 months but has made an appointment on: ___  [x]Medication is not within protocol  []Patient did not complete follow up recommendations  []Other: ___  Dr. Gucci Moreira, please review and send. Thank you.

## 2023-12-11 ENCOUNTER — TELEPHONE (OUTPATIENT)
Dept: SURGERY | Facility: CLINIC | Age: 53
End: 2023-12-11

## 2023-12-11 ENCOUNTER — TELEPHONE (OUTPATIENT)
Dept: OTOLARYNGOLOGY | Facility: CLINIC | Age: 53
End: 2023-12-11

## 2023-12-12 NOTE — TELEPHONE ENCOUNTER
Patient has a [facility] employee plan. This plan excludes hearing aids from coverage. We can attempt to obtain benefits [facility] after the first of the year, but will need the updated insurance information.

## 2023-12-13 NOTE — TELEPHONE ENCOUNTER
Noted - we will send for benefit authorization again in 2024.  Asking patient via my chart to send in new insurance info in 2024

## 2023-12-18 ENCOUNTER — TELEPHONE (OUTPATIENT)
Dept: SURGERY | Facility: CLINIC | Age: 53
End: 2023-12-18

## 2023-12-23 ENCOUNTER — LAB ENCOUNTER (OUTPATIENT)
Dept: LAB | Age: 53
End: 2023-12-23
Attending: OBSTETRICS & GYNECOLOGY
Payer: COMMERCIAL

## 2023-12-23 DIAGNOSIS — N95.1 PERIMENOPAUSE: ICD-10-CM

## 2023-12-23 LAB
ALBUMIN SERPL-MCNC: 4.5 G/DL (ref 3.2–4.8)
ALBUMIN/GLOB SERPL: 2 {RATIO} (ref 1–2)
ALP LIVER SERPL-CCNC: 98 U/L
ALT SERPL-CCNC: 16 U/L
ANION GAP SERPL CALC-SCNC: 3 MMOL/L (ref 0–18)
AST SERPL-CCNC: 23 U/L (ref ?–34)
BILIRUB SERPL-MCNC: 0.3 MG/DL (ref 0.3–1.2)
BUN BLD-MCNC: 28 MG/DL (ref 9–23)
BUN/CREAT SERPL: 41.2 (ref 10–20)
CALCIUM BLD-MCNC: 9.7 MG/DL (ref 8.7–10.4)
CHLORIDE SERPL-SCNC: 108 MMOL/L (ref 98–112)
CO2 SERPL-SCNC: 31 MMOL/L (ref 21–32)
CREAT BLD-MCNC: 0.68 MG/DL
EGFRCR SERPLBLD CKD-EPI 2021: 103 ML/MIN/1.73M2 (ref 60–?)
FASTING STATUS PATIENT QL REPORTED: YES
GLOBULIN PLAS-MCNC: 2.2 G/DL (ref 2.8–4.4)
GLUCOSE BLD-MCNC: 87 MG/DL (ref 70–99)
OSMOLALITY SERPL CALC.SUM OF ELEC: 299 MOSM/KG (ref 275–295)
POTASSIUM SERPL-SCNC: 3.9 MMOL/L (ref 3.5–5.1)
PROT SERPL-MCNC: 6.7 G/DL (ref 5.7–8.2)
SODIUM SERPL-SCNC: 142 MMOL/L (ref 136–145)

## 2023-12-23 PROCEDURE — 80053 COMPREHEN METABOLIC PANEL: CPT

## 2023-12-23 PROCEDURE — 36415 COLL VENOUS BLD VENIPUNCTURE: CPT

## 2023-12-25 ENCOUNTER — PATIENT MESSAGE (OUTPATIENT)
Dept: OBGYN CLINIC | Facility: CLINIC | Age: 53
End: 2023-12-25

## 2023-12-26 NOTE — TELEPHONE ENCOUNTER
From: Junie Nose  To: Julio Cesar Rolando  Sent: 12/25/2023 6:06 PM CST  Subject: Lab results    Hi,   First, I am embarrassed. Second, I need to apologize to you. As you can see my labs tell me that I am dehydrated. When I lived back in [place], ever since my gastric bypass I have had issue with enough fluids. I would have to get fluid infusions every couple of months. Here, now my primary doesn't think its necessary, may after she sees these labs she will reconsider. I also work 10 hour shifts. I don't much hydration at all. When I had previously told you what Dr. Ovi Cilnton had said to me about sweating she had given me a 30 trial bottle. I didn't take it. She asked why, I told her I didn't want to go behind you back, you are my doctor. She understood. After about a month I finally gave in and started the trial bottle, it worked like a dream.     But, I didn't follow your instructions and do the base line ;abs until just the other day. I can see how I really messed things up because now I don't know if it's the trial pills or am I really dehydrated? I feel honesty is the best policy. I want to apologize. I have stopped taking them all together. And yes the sweats have come back gradually. I have always had bad kidney results if you look at my labs though. I really need some fluid infusions.      Niru Donaldson

## 2023-12-26 NOTE — TELEPHONE ENCOUNTER
Pt Sent [facility] message asking about dehydration, please see [facility] message from 12/25/23. To BRANDI for recs.

## 2023-12-28 RX ORDER — FEZOLINETANT 45 MG/1
45 TABLET, FILM COATED ORAL DAILY
Qty: 90 TABLET | Refills: 3 | Status: SHIPPED | OUTPATIENT
Start: 2023-12-28

## 2023-12-29 ENCOUNTER — TELEPHONE (OUTPATIENT)
Dept: OBGYN CLINIC | Facility: CLINIC | Age: 53
End: 2023-12-29

## 2023-12-29 NOTE — TELEPHONE ENCOUNTER
Received pharmacy notice requesting alternative for Veozah due to insurance coverage. Message to [facility] .

## 2023-12-30 NOTE — TELEPHONE ENCOUNTER
Duane Bueno, DO  [facility]Gyne Clinical Staff  Caller: Unspecified (Yesterday, 10:06 AM)  Pt states she was given this by another provider. Was it an rx?

## 2024-01-02 ENCOUNTER — OFFICE VISIT (OUTPATIENT)
Dept: SURGERY | Facility: CLINIC | Age: 54
End: 2024-01-02
Payer: COMMERCIAL

## 2024-01-02 VITALS
HEART RATE: 89 BPM | SYSTOLIC BLOOD PRESSURE: 122 MMHG | DIASTOLIC BLOOD PRESSURE: 74 MMHG | WEIGHT: 135.5 LBS | HEIGHT: 58.9 IN | BODY MASS INDEX: 27.32 KG/M2 | OXYGEN SATURATION: 99 %

## 2024-01-02 DIAGNOSIS — E66.9 OBESITY (BMI 30-39.9): ICD-10-CM

## 2024-01-02 DIAGNOSIS — E66.3 OVERWEIGHT (BMI 25.0-29.9): ICD-10-CM

## 2024-01-02 DIAGNOSIS — E44.1 MILD PROTEIN-CALORIE MALNUTRITION (HCC): Primary | ICD-10-CM

## 2024-01-02 DIAGNOSIS — I10 HTN (HYPERTENSION), BENIGN: ICD-10-CM

## 2024-01-02 DIAGNOSIS — H90.3 SENSORINEURAL HEARING LOSS, BILATERAL: Primary | ICD-10-CM

## 2024-01-02 DIAGNOSIS — R73.03 PRE-DIABETES: ICD-10-CM

## 2024-01-02 RX ORDER — SEMAGLUTIDE 2.4 MG/.75ML
2.4 INJECTION, SOLUTION SUBCUTANEOUS WEEKLY
Qty: 9 ML | Refills: 2 | Status: SHIPPED | OUTPATIENT
Start: 2024-01-02 | End: 2024-04-01

## 2024-01-02 NOTE — PROGRESS NOTES
----- Message from Agustín Carrillo MD sent at 1/7/2021  5:16 PM CST -----  Inform patient that the urine sample is contaminated so they did not processes for culture.  Please advise patient that we need to recollect the urine sample.  Patient is strongly recommended to call urologist regarding collection of correct sample and go from there and he is having recurrent UTI so recommended to follow-up with urologist as we discussed yesterday.   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  no, Joint pain-Improvement?  no, Snoring-Improvement?  no and Sleep apnea-Improvement?  no    Objective     Vitals: /74 (BP Location: Left arm, Patient Position: Sitting, Cuff Size: adult)   Pulse 89   Ht 4' 10.9\" (1.496 m)   Wt 135 lb 8 oz (61.5 kg)   SpO2 99%   BMI 27.46 kg/m²     Starting weight: 225   Current weight:    Interval weight loss: -10   Total weight loss:  -89    Last 3 Weights   24 1224 135 lb 8 oz (61.5 kg)   10/12/23 1443 142 lb (64.4 kg)   23 1850 142 lb (64.4 kg)       Patient Medications:    Current Outpatient Medications:     Fezolinetant (VEOZAH) 45 MG Oral Tab, Take 45 mg by mouth daily., Disp: 90 tablet, Rfl: 3    sertraline (ZOLOFT) 100 MG Oral Tab, Take 2 tablets (200 mg total) by mouth every morning., Disp: 180 tablet, Rfl: 0    celecoxib 200 MG Oral Cap, Take 1 capsule (200 mg total) by mouth daily as needed for Pain., Disp: 30 capsule, Rfl: 0    cyclobenzaprine 5 MG Oral Tab, Take 1 tablet (5 mg total) by mouth nightly., Disp: 30 tablet, Rfl: 1    glycopyrrolate 1 MG Oral Tab, Take 1 tablet (1 mg total) by mouth 3 (three) times daily., Disp: 90 tablet, Rfl: 1    semaglutide-weight  management (WEGOVY) 2.4 MG/0.75ML Subcutaneous Solution Auto-injector, Inject 0.75 mL (2.4 mg total) into the skin once a week., Disp: 9 mL, Rfl: 0    BUPROPION  MG Oral Tablet 12 Hr, TAKE 1 TABLET BY MOUTH TWICE A DAY, Disp: 180 tablet, Rfl: 0    HYDROXYZINE 50 MG Oral Tab, TAKE 1 TABLET BY MOUTH THREE TIMES A DAY AS NEEDED, Disp: 270 tablet, Rfl: 0    azelastine 0.1 % Nasal Solution, 2 sprays by Nasal route 2 (two) times daily., Disp: 1 each, Rfl: 3    predniSONE 10 MG Oral Tab, Take 1 tablet (10 mg total) by mouth in the morning, at noon, and at bedtime., Disp: 15 tablet, Rfl: 0    losartan 50 MG Oral Tab, Take 1 tablet (50 mg total) by mouth daily., Disp: 90 tablet, Rfl: 3    hydroCHLOROthiazide 25 MG Oral Tab, Take 1 tablet (25 mg total) by mouth daily., Disp: 90 tablet, Rfl: 3    metoprolol succinate ER 25 MG Oral Tablet 24 Hr, Take 1 tablet (25 mg total) by mouth daily., Disp: 90 tablet, Rfl: 3    cetirizine 10 MG Oral Tab, Take 1 tablet (10 mg total) by mouth daily., Disp: 90 tablet, Rfl: 3    Allergies:  Patient has no known allergies.     Social History:    Social History     Socioeconomic History    Marital status:    Tobacco Use    Smoking status: Light Smoker    Smokeless tobacco: Never    Tobacco comments:     1 PACK A WEEK   Vaping Use    Vaping Use: Never used   Substance and Sexual Activity    Alcohol use: Yes    Drug use: Never     Surgical History:    Past Surgical History:   Procedure Laterality Date    ADJUSTMENT GASTRIC BAND       DELIVERY ONLY      CHOLECYSTECTOMY      COLONOSCOPY      COLONOSCOPY N/A 2022    Procedure: COLONOSCOPY;  Surgeon: ALVINA Goldstein MD;  Location: [facility] ENDOSCOPY    GASTRIC BYPASS,OBESITY,SB RECONSTRUC      HERNIA SURGERY      LAP GASTRIC BYPASS/SHAYLA-EN-Y  2003    [place] surgical weight 225    REDUCTION LEFT Left     REDUCTION OF LARGE BREAST Bilateral     REDUCTION RIGHT Right     REMOVAL OF OVARY(S) Right        Family History:     Family History   Problem Relation Age of Onset    PTSD Mother     Suicide History Maternal Grandmother         completd suicide    Suicide History Paternal Grandfather         completed suicide    Alcohol and Other Disorders Associated Sister     Substance Abuse Sister     Depression Sister     Anxiety Sister     Alcohol and Other Disorders Associated Brother     Depression Brother     Anxiety Brother     Alcohol and Other Disorders Associated Sister     Anxiety Sister     Depression Sister     Learning Disability Sister     Cancer Paternal Grandmother         colon cancer    Breast Cancer Neg     Ovarian Cancer Neg        Physical Exam:  Vital signs: /74 (BP Location: Left arm, Patient Position: Sitting, Cuff Size: adult)   Pulse 89   Ht 4' 10.9\" (1.496 m)   Wt 135 lb 8 oz (61.5 kg)   SpO2 99%   BMI 27.46 kg/m²   General appearance: alert, appears stated age, cooperative and mildly obese  Head: Normocephalic, without obvious abnormality, atraumatic  Lungs: clear to auscultation bilaterally  Heart: S1, S2 normal, no murmur, click, rub or gallop, regular rate and rhythm  Abdomen:  soft, obese, non tender  Extremities: extremities normal, atraumatic, no cyanosis or edema  Skin: Skin color, texture, turgor normal. No rashes or lesions  Neurologic: Grossly normal   ROS:    Constitutional: negative  Respiratory: negative  Cardiovascular: negative  Gastrointestinal: negative  Musculoskeletal:positive for arthralgias  Neurological: positive for headaches  Behavioral/Psych: positive for tobacco use and stress  Endocrine: negative  All other systems were reviewed and are negative    Assessment     HYPERCHOLESTEROLEMIA:  The patient states that her cholesterol has been well controlled on her current medication.    Lab Results   Component Value Date/Time    CHOLEST 191 06/17/2023 11:04 AM     (H) 06/17/2023 11:04 AM    HDL 69 (H) 06/17/2023 11:04 AM    TRIG 63 06/17/2023 11:04 AM    VLDL 11 06/17/2023 11:04  AM     Patient was instructed to continue wearing their CPaP as recommended.     Encounter Diagnosis(ses):   Encounter Diagnoses   Name Primary?    Mild protein-calorie malnutrition (HCC) Yes    HTN (hypertension), benign     Pre-diabetes     Overweight (BMI 25.0-29.9)        Plan     S/P Nancy en Y 2006  Conversion from lap band    Wants to get back on track    ETOH free since 2021    Increase physical activity    PHENTERMINE: increased chest pain and discontinued  Cardiac work up done    Blood work done    sertraline by psych    Wegovy: tolerating well  Refill at 2.4 mg  Has done well with Saxenda in the past    Bariatric labs due in July    Follow up with psych team as scheduled    No orders of the defined types were placed in this encounter.        Armani Snyder MD

## 2024-01-06 ENCOUNTER — PATIENT MESSAGE (OUTPATIENT)
Dept: OTOLARYNGOLOGY | Facility: CLINIC | Age: 54
End: 2024-01-06

## 2024-01-08 NOTE — TELEPHONE ENCOUNTER
From: Marjan Castro  To: Baldo Watson  Sent: 2024 2:40 PM CST  Subject: New insurance card     Hi please update my new [facility] employee medical insurance card.   Thanks!   Marjan Castro    10/03/1970  [phone]

## 2024-01-09 DIAGNOSIS — H90.3 SENSORINEURAL HEARING LOSS, BILATERAL: Primary | ICD-10-CM

## 2024-01-14 ENCOUNTER — PATIENT MESSAGE (OUTPATIENT)
Dept: OBGYN CLINIC | Facility: CLINIC | Age: 54
End: 2024-01-14

## 2024-01-15 NOTE — TELEPHONE ENCOUNTER
From: Marjan Castro  To: Saturnino Espana  Sent: 1/14/2024 12:22 PM CST  Subject: Appointment     I had to cancel my appointment on February 20 at 3:20, I started working 10 hour shifts on Monday Tuesday Thursday Friday, can we reschedule my Colpo on a Wednesday? Preferably after 3pm , if not anytime on Wednesday will be fine. 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 Thank you very much   Marjan

## 2024-02-14 ENCOUNTER — OFFICE VISIT (OUTPATIENT)
Dept: OBGYN CLINIC | Facility: CLINIC | Age: 54
End: 2024-02-14
Payer: COMMERCIAL

## 2024-02-14 VITALS
SYSTOLIC BLOOD PRESSURE: 102 MMHG | HEART RATE: 77 BPM | BODY MASS INDEX: 27 KG/M2 | WEIGHT: 134.81 LBS | DIASTOLIC BLOOD PRESSURE: 68 MMHG

## 2024-02-14 DIAGNOSIS — Z12.4 SCREENING FOR MALIGNANT NEOPLASM OF CERVIX: ICD-10-CM

## 2024-02-14 DIAGNOSIS — N87.1 DYSPLASIA OF CERVIX, HIGH GRADE CIN 2: Primary | ICD-10-CM

## 2024-02-14 PROCEDURE — 88305 TISSUE EXAM BY PATHOLOGIST: CPT | Performed by: OBSTETRICS & GYNECOLOGY

## 2024-02-14 PROCEDURE — 87624 HPV HI-RISK TYP POOLED RSLT: CPT | Performed by: OBSTETRICS & GYNECOLOGY

## 2024-02-14 NOTE — PROCEDURES
Colpo with Endocervical Curettage    Consent signed.    Procedure discussed with patient in detail including indication, risk, benefits, alternatives and complications.    Indication: CIN2 on ECCs on colpo 6 months ago; pap neg/HPV+ (persistent)---pt elected for surveillance    Plan for pap and colpo today    Procedure:  The patient was placed in the dorsal lithotomy position.  [place] speculum was placed in the vagina.  Pap performed.  Cervix prepped with: Acetic acid  Under colposcopic examination the transition zone was retracted.  Endocervix was curetted using a Kervorkian curette.  Specimen sent to pathology.    Patient tolerated procedure well.  Discharge instructions of pelvic rest & no swimming x one week.

## 2024-02-15 LAB — HPV I/H RISK 1 DNA SPEC QL NAA+PROBE: POSITIVE

## 2024-02-15 RX ORDER — CELECOXIB 200 MG/1
200 CAPSULE ORAL DAILY PRN
Qty: 30 CAPSULE | Refills: 0 | Status: SHIPPED | OUTPATIENT
Start: 2024-02-15

## 2024-02-19 ENCOUNTER — TELEPHONE (OUTPATIENT)
Dept: SURGERY | Facility: CLINIC | Age: 54
End: 2024-02-19

## 2024-02-19 NOTE — TELEPHONE ENCOUNTER
Returned patient's voicemail regarding bill for 1/2/24 appt with Dr. Snyder. Left voicemail for patient letting her know a billing inquiry has been submitted to billing dept and once we received a response, we will reach back out to her.

## 2024-02-28 ENCOUNTER — TELEPHONE (OUTPATIENT)
Dept: OBGYN CLINIC | Facility: CLINIC | Age: 54
End: 2024-02-28

## 2024-02-28 DIAGNOSIS — Z51.81 MEDICATION MONITORING ENCOUNTER: Primary | ICD-10-CM

## 2024-02-28 DIAGNOSIS — E66.9 OBESITY (BMI 30-39.9): ICD-10-CM

## 2024-02-28 NOTE — TELEPHONE ENCOUNTER
----- Message from Saturnino Espana DO sent at 2/27/2024  6:42 PM CST -----  Please notify f CIN1 on colpo.  Waiting on pap still.  We will plan for pap in 6 months at annual unless pap comes back high grade.  Please make sure she has CMP q3mo for the first year of taking veozah.

## 2024-02-29 RX ORDER — SEMAGLUTIDE 2.4 MG/.75ML
2.4 INJECTION, SOLUTION SUBCUTANEOUS WEEKLY
Qty: 9 ML | Refills: 0 | Status: SHIPPED | OUTPATIENT
Start: 2024-02-29 | End: 2024-05-29

## 2024-03-05 LAB
HPV16 DNA CVX QL PROBE+SIG AMP: POSITIVE
HPV18 DNA CVX QL PROBE+SIG AMP: NEGATIVE

## 2024-03-25 RX ORDER — CELECOXIB 200 MG/1
200 CAPSULE ORAL DAILY PRN
Qty: 30 CAPSULE | Refills: 0 | OUTPATIENT
Start: 2024-03-25

## 2024-03-25 NOTE — TELEPHONE ENCOUNTER
Patient has an appointment scheduled with Dr. Kevon Valenzuela on 4/11/23 for Bilateral foot, Right foot, toe next to big toe is hammer toe. Bunion on big toe right foot. Please advise if imaging is needed for bilateral feet. Session ID: [id number]  Language: Slovak   ID: #[id number]   Name: John

## 2024-04-08 DIAGNOSIS — E66.9 OBESITY (BMI 30-39.9): Primary | ICD-10-CM

## 2024-04-10 ENCOUNTER — OFFICE VISIT (OUTPATIENT)
Dept: FAMILY MEDICINE CLINIC | Facility: CLINIC | Age: 54
End: 2024-04-10
Payer: COMMERCIAL

## 2024-04-10 VITALS
DIASTOLIC BLOOD PRESSURE: 75 MMHG | HEART RATE: 86 BPM | WEIGHT: 131.63 LBS | HEIGHT: 58.9 IN | SYSTOLIC BLOOD PRESSURE: 105 MMHG | BODY MASS INDEX: 26.54 KG/M2 | TEMPERATURE: 98 F

## 2024-04-10 DIAGNOSIS — J02.9 SORE THROAT: ICD-10-CM

## 2024-04-10 DIAGNOSIS — J02.0 STREP PHARYNGITIS: Primary | ICD-10-CM

## 2024-04-10 LAB
CONTROL LINE PRESENT WITH A CLEAR BACKGROUND (YES/NO): YES YES/NO
KIT LOT #: NORMAL NUMERIC
STREP GRP A CUL-SCR: POSITIVE

## 2024-04-10 PROCEDURE — 87880 STREP A ASSAY W/OPTIC: CPT | Performed by: PHYSICIAN ASSISTANT

## 2024-04-10 PROCEDURE — 99213 OFFICE O/P EST LOW 20 MIN: CPT | Performed by: PHYSICIAN ASSISTANT

## 2024-04-10 RX ORDER — AMOXICILLIN 500 MG/1
500 CAPSULE ORAL 2 TIMES DAILY
Qty: 20 CAPSULE | Refills: 0 | Status: SHIPPED | OUTPATIENT
Start: 2024-04-10 | End: 2024-04-20

## 2024-04-10 RX ORDER — CYCLOBENZAPRINE HCL 10 MG
10 TABLET ORAL NIGHTLY
COMMUNITY
Start: 2023-12-05

## 2024-04-10 RX ORDER — MELOXICAM 15 MG/1
15 TABLET ORAL DAILY
COMMUNITY
Start: 2023-12-05

## 2024-04-10 NOTE — PROGRESS NOTES
HPI:     Sore Throat   This is a new problem. Episode onset: 3 weeks. The problem has been gradually worsening. There has been no fever. Associated symptoms include ear pain and headaches. Pertinent negatives include no abdominal pain, congestion, coughing, diarrhea, ear discharge, hoarse voice, neck pain, shortness of breath, swollen glands or vomiting.       Medications:     Current Outpatient Medications   Medication Sig Dispense Refill    Meloxicam 15 MG Oral Tab Take 1 tablet (15 mg total) by mouth daily.      cyclobenzaprine 10 MG Oral Tab Take 1 tablet (10 mg total) by mouth nightly. TAKE AT BEDTIME      amoxicillin 500 MG Oral Cap Take 1 capsule (500 mg total) by mouth 2 (two) times daily for 10 days. 20 capsule 0    SERTRALINE 100 MG Oral Tab TAKE 2 TABLETS BY MOUTH EVERY MORNING. 180 tablet 0    CELECOXIB 200 MG Oral Cap TAKE 1 CAPSULE BY MOUTH DAILY AS NEEDED FOR PAIN. 30 capsule 0    Fezolinetant (VEOZAH) 45 MG Oral Tab Take 45 mg by mouth daily. 90 tablet 3    glycopyrrolate 1 MG Oral Tab Take 1 tablet (1 mg total) by mouth 3 (three) times daily. 90 tablet 1    losartan 50 MG Oral Tab Take 1 tablet (50 mg total) by mouth daily. 90 tablet 3    hydroCHLOROthiazide 25 MG Oral Tab Take 1 tablet (25 mg total) by mouth daily. 90 tablet 3    metoprolol succinate ER 25 MG Oral Tablet 24 Hr Take 1 tablet (25 mg total) by mouth daily. 90 tablet 3    semaglutide-weight management 1 MG/0.5ML Subcutaneous Solution Auto-injector Inject 0.5 mL (1 mg total) into the skin once a week for 4 doses. 2 mL 3    semaglutide-weight management (WEGOVY) 2.4 MG/0.75ML Subcutaneous Solution Auto-injector Inject 0.75 mL (2.4 mg total) into the skin once a week. 9 mL 0    cetirizine 10 MG Oral Tab Take 1 tablet (10 mg total) by mouth daily. (Patient not taking: Reported on 4/10/2024) 90 tablet 3       Allergies:   No Known Allergies    History:     Health Maintenance   Topic Date Due    Tobacco Cessation Counseling 1 Year  Never  done    COVID-19 Vaccine (3 -  season) 2023    Annual Depression Screening  2024    Annual Physical  2024    Mammogram  2024    Influenza Vaccine (Season Ended) 10/01/2024    Pap Smear  2027    DTaP,Tdap,and Td Vaccines (3 - Td or Tdap) 2030    Colorectal Cancer Screening  2032    Pneumococcal Vaccine: Birth to 64yrs  Completed    Zoster Vaccines  Completed       No LMP recorded. Patient is postmenopausal.   Past Medical History:     Past Medical History:    Bleeding ulcer    Dumping syndrome    H/O gastric bypass    for weight loss    H/O laparoscopic adjustable gastric banding    removed     History of blood transfusion    Hypertension    Obesity (BMI 30-39.9)    KRISTY on CPAP    PONV (postoperative nausea and vomiting)    Screen for colon cancer    repeat CLN in     Sleep apnea    cpap       Past Surgical History:     Past Surgical History:   Procedure Laterality Date    Adjustment gastric band       delivery only      Cholecystectomy      Colonoscopy      Colonoscopy N/A 2022    Procedure: COLONOSCOPY;  Surgeon: ALVINA Goldstein MD;  Location: [facility] ENDOSCOPY    Gastric bypass,obesity,sb reconstruc      Hernia surgery      Lap gastric bypass/yue-en-y  2003    [place] weight 225    Reduction left Left     Reduction of large breast Bilateral     Reduction right Right     Removal of ovary(s) Right        Family History:     Family History   Problem Relation Age of Onset    PTSD Mother     Suicide History Maternal Grandmother         completd suicide    Suicide History Paternal Grandfather         completed suicide    Alcohol and Other Disorders Associated Sister     Substance Abuse Sister     Depression Sister     Anxiety Sister     Alcohol and Other Disorders Associated Brother     Depression Brother     Anxiety Brother     Alcohol and Other Disorders Associated Sister     Anxiety Sister     Depression Sister     Learning Disability  Sister     Cancer Paternal Grandmother         colon cancer    Breast Cancer Neg     Ovarian Cancer Neg        Social History:     Social History     Socioeconomic History    Marital status:      Spouse name: Not on file    Number of children: Not on file    Years of education: Not on file    Highest education level: Not on file   Occupational History    Not on file   Tobacco Use    Smoking status: Light Smoker    Smokeless tobacco: Never    Tobacco comments:     1 PACK A WEEK   Vaping Use    Vaping status: Never Used   Substance and Sexual Activity    Alcohol use: Yes    Drug use: Never    Sexual activity: Not on file   Other Topics Concern    Not on file   Social History Narrative    Not on file     Social Determinants of Health     Financial Resource Strain: Not on file   Food Insecurity: Not on file   Transportation Needs: Not on file   Physical Activity: Not on file   Stress: Low Risk  (8/8/2021)    Received from [employer]    Stress     Stress Risk: Not on file     General Anxiety Disorder (NINO-7) Score: Not on file   Social Connections: Unknown (5/16/2023)    Received from [employer]    Social Network     Social Network: Not on file   Housing Stability: Not on file       Review of Systems:   Review of Systems   Constitutional:  Negative for activity change, chills, fatigue and fever.   HENT:  Positive for ear pain and sore throat. Negative for congestion, ear discharge, hoarse voice, postnasal drip, rhinorrhea, sinus pressure and sinus pain.    Respiratory:  Negative for cough, chest tightness, shortness of breath and wheezing.    Cardiovascular:  Negative for chest pain and palpitations.   Gastrointestinal:  Negative for abdominal distention, abdominal pain, blood in stool, constipation, diarrhea, nausea and vomiting.   Musculoskeletal:  Negative for neck pain.   Skin:  Negative for rash.   Neurological:  Positive for headaches.        Vitals:    04/10/24 1509    BP: 105/75   Pulse: 86   Temp: 98.3 °F (36.8 °C)   Weight: 131 lb 9.6 oz (59.7 kg)   Height: 4' 10.9\" (1.496 m)     Body mass index is 26.67 kg/m².    Physical Exam:   Physical Exam  Vitals reviewed.   Constitutional:       General: She is not in acute distress.     Appearance: She is well-developed.   HENT:      Head: Normocephalic and atraumatic.      Right Ear: Tympanic membrane, ear canal and external ear normal. There is no impacted cerumen.      Left Ear: Tympanic membrane, ear canal and external ear normal. There is no impacted cerumen.      Nose: Nose normal.      Mouth/Throat:      Mouth: Mucous membranes are moist.      Pharynx: Oropharynx is clear. Posterior oropharyngeal erythema present. No oropharyngeal exudate.   Eyes:      General:         Right eye: No discharge.         Left eye: No discharge.      Conjunctiva/sclera: Conjunctivae normal.   Cardiovascular:      Rate and Rhythm: Normal rate and regular rhythm.      Heart sounds: Normal heart sounds, S1 normal and S2 normal. No murmur heard.  Pulmonary:      Effort: Pulmonary effort is normal.      Breath sounds: Normal breath sounds. No wheezing or rales.   Chest:      Chest wall: No tenderness.   Lymphadenopathy:      Cervical: Cervical adenopathy present.   Skin:     Findings: No rash.   Neurological:      Mental Status: She is alert and oriented to person, place, and time.   Psychiatric:         Behavior: Behavior is cooperative.          Assessment and Plan::     Problem List Items Addressed This Visit    None  Visit Diagnoses       Strep pharyngitis    -  Primary    Relevant Medications    amoxicillin 500 MG Oral Cap    Sore throat        Relevant Orders    POC Rapid Strep [66661] (Completed)        Supportive care includes:    encourage fluid intake   Advise patient to take Tylenol/Ibuprofen as needed for pain.  warm salt water gargles   humidifier     Discussed plan of care with pt and pt is in agreement.All questions answered. Pt to call  with questions or concerns.

## 2024-05-29 ENCOUNTER — PATIENT MESSAGE (OUTPATIENT)
Dept: FAMILY MEDICINE CLINIC | Facility: CLINIC | Age: 54
End: 2024-05-29

## 2024-05-29 ENCOUNTER — PATIENT MESSAGE (OUTPATIENT)
Dept: SURGERY | Facility: CLINIC | Age: 54
End: 2024-05-29

## 2024-05-29 RX ORDER — HYDROCHLOROTHIAZIDE 25 MG/1
25 TABLET ORAL DAILY
Qty: 90 TABLET | Refills: 3 | Status: SHIPPED | OUTPATIENT
Start: 2024-05-29

## 2024-05-29 RX ORDER — LOSARTAN POTASSIUM 50 MG/1
50 TABLET ORAL DAILY
Qty: 90 TABLET | Refills: 3 | Status: SHIPPED | OUTPATIENT
Start: 2024-05-29

## 2024-05-29 RX ORDER — METOPROLOL SUCCINATE 25 MG/1
25 TABLET, EXTENDED RELEASE ORAL DAILY
Qty: 90 TABLET | Refills: 3 | Status: SHIPPED | OUTPATIENT
Start: 2024-05-29

## 2024-05-29 NOTE — TELEPHONE ENCOUNTER
Refill passed per [facility] protocol.  Requested Prescriptions   Pending Prescriptions Disp Refills    hydroCHLOROthiazide 25 MG Oral Tab 90 tablet 3     Sig: Take 1 tablet (25 mg total) by mouth daily.       Hypertension Medications Protocol Passed - 5/29/2024 10:01 AM        Passed - CMP or BMP in past 12 months        Passed - Last BP reading less than 140/90     BP Readings from Last 1 Encounters:   04/10/24 105/75               Passed - In person appointment or virtual visit in the past 12 mos or appointment in next 3 mos     Recent Outpatient Visits              1 month ago Strep pharyngitis    [facility], [address], [place] Dorina Ramirez PA-C    Office Visit    3 months ago Dysplasia of cervix, high grade TRISTON 2    [facility] - OB/GYN Saturnino Espana DO    Office Visit    4 months ago Mild protein-calorie malnutrition (HCC)    [facility] Armani Snyder MD    Office Visit    7 months ago Sensorineural hearing loss, bilateral    [facility] [place] P[place] Au.D    Office Visit    7 months ago Hearing loss, unspecified hearing loss type, unspecified laterality    [facility] Baldo Watson MD    Office Visit          Future Appointments         Provider Department Appt Notes    In 2 weeks Dorina Ramirez PA-C [facility], [address], [place]     In 1 month Armani Snyder MD [facility]                     Passed - EGFRCR or GFRNAA > 50     GFR Evaluation  EGFRCR: 103 , resulted on 12/23/2023            losartan 50 MG Oral Tab 90 tablet 3     Sig: Take 1 tablet (50 mg total) by mouth daily.       Hypertension Medications Protocol Passed - 5/29/2024 10:01 AM        Passed - CMP or BMP in past 12 months        Passed - Last BP reading less than 140/90      BP Readings from Last 1 Encounters:   04/10/24 105/75               Passed - In person appointment or virtual visit in the past 12 mos or appointment in next 3 mos     Recent Outpatient Visits              1 month ago Strep pharyngitis    [facility], [address], [place] Dorina Ramirez PA-C    Office Visit    3 months ago Dysplasia of cervix, high grade TRISTON 2    [facility] - OB/GYN Saturnino Espana DO    Office Visit    4 months ago Mild protein-calorie malnutrition (HCC)    [facility] Armani Snyder MD    Office Visit    7 months ago Sensorineural hearing loss, bilateral    [facility] [place] Au.D    Office Visit    7 months ago Hearing loss, unspecified hearing loss type, unspecified laterality    [facility] Baldo Watson MD    Office Visit          Future Appointments         Provider Department Appt Notes    In 2 weeks Dorina Ramirez PA-C [facility], [address], [place]     In 1 month Armani Snyder MD [facility]                     Passed - EGFRCR or GFRNAA > 50     GFR Evaluation  EGFRCR: 103 , resulted on 12/23/2023            metoprolol succinate ER 25 MG Oral Tablet 24 Hr 90 tablet 3     Sig: Take 1 tablet (25 mg total) by mouth daily.       Hypertension Medications Protocol Passed - 5/29/2024 10:01 AM        Passed - CMP or BMP in past 12 months        Passed - Last BP reading less than 140/90     BP Readings from Last 1 Encounters:   04/10/24 105/75               Passed - In person appointment or virtual visit in the past 12 mos or appointment in next 3 mos     Recent Outpatient Visits              1 month ago Strep pharyngitis    [facility], [address], [place] Dorina Ramirez PA-C    Office Visit    3 months ago  Dysplasia of cervix, high grade TRISTON 2    [facility] - OB/GYN Saturnino Espana,     Office Visit    4 months ago Mild protein-calorie malnutrition (HCC)    [facility] Armani Snyder MD    Office Visit    7 months ago Sensorineural hearing loss, bilateral    [facility] [place] Au.D    Office Visit    7 months ago Hearing loss, unspecified hearing loss type, unspecified laterality    [facility] Baldo Watson MD    Office Visit          Future Appointments         Provider Department Appt Notes    In 2 weeks Dorina Ramirez PA-C [facility], [address], [place]     In 1 month Armani Snyder MD [facility]                     Passed - EGFRCR or GFRNAA > 50     GFR Evaluation  EGFRCR: 103 , resulted on 12/23/2023             Future Appointments         Provider Department Appt Notes    In 2 weeks Dorina Ramirez PA-C [facility], [address], [place]     In 1 month Armani Snyder MD [facility]           Recent Outpatient Visits              1 month ago Strep pharyngitis    [facility], [address], [place] Dorina Ramirez PA-C    Office Visit    3 months ago Dysplasia of cervix, high grade TRISTON 2    [facility] - OB/GYN Saturnino Espana,     Office Visit    4 months ago Mild protein-calorie malnutrition (HCC)    [facility] Armani Snyder MD    Office Visit    7 months ago Sensorineural hearing loss, bilateral    [facility] [place] P[place] Au.D    Office Visit    7 months ago Hearing loss, unspecified hearing loss type, unspecified laterality    [facility]  Group, [facility], Baldo Iqbal MD    Office Visit

## 2024-05-29 NOTE — TELEPHONE ENCOUNTER
Patient called and scheduled a physical appointment for 06/18/2024 at 2pm. Per patient, she would like a 90 day refill for the following medications:      hydroCHLOROthiazide 25 MG Oral Tab     losartan 50 MG Oral Tab     metoprolol succinate ER 25 MG Oral Tablet 24 Hr

## 2024-07-30 DIAGNOSIS — E66.9 OBESITY (BMI 30-39.9): Primary | ICD-10-CM

## 2024-07-30 RX ORDER — SEMAGLUTIDE 2.4 MG/.75ML
2.4 INJECTION, SOLUTION SUBCUTANEOUS WEEKLY
Qty: 9 ML | Refills: 0 | Status: SHIPPED | OUTPATIENT
Start: 2024-07-30 | End: 2024-10-28

## 2024-08-14 ENCOUNTER — PATIENT MESSAGE (OUTPATIENT)
Dept: OBGYN CLINIC | Facility: CLINIC | Age: 54
End: 2024-08-14
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
## 2024-08-22 ENCOUNTER — OFFICE VISIT (OUTPATIENT)
Dept: FAMILY MEDICINE CLINIC | Facility: CLINIC | Age: 54
End: 2024-08-22
Payer: COMMERCIAL

## 2024-08-22 ENCOUNTER — LAB ENCOUNTER (OUTPATIENT)
Dept: LAB | Age: 54
End: 2024-08-22
Attending: PHYSICIAN ASSISTANT
Payer: COMMERCIAL

## 2024-08-22 VITALS
BODY MASS INDEX: 27.01 KG/M2 | HEART RATE: 86 BPM | SYSTOLIC BLOOD PRESSURE: 115 MMHG | DIASTOLIC BLOOD PRESSURE: 79 MMHG | HEIGHT: 58.9 IN | WEIGHT: 134 LBS

## 2024-08-22 DIAGNOSIS — Z00.00 ROUTINE GENERAL MEDICAL EXAMINATION AT A HEALTH CARE FACILITY: ICD-10-CM

## 2024-08-22 DIAGNOSIS — I10 ESSENTIAL HYPERTENSION: ICD-10-CM

## 2024-08-22 DIAGNOSIS — Z12.31 ENCOUNTER FOR SCREENING MAMMOGRAM FOR BREAST CANCER: ICD-10-CM

## 2024-08-22 DIAGNOSIS — Z00.00 ROUTINE GENERAL MEDICAL EXAMINATION AT A HEALTH CARE FACILITY: Primary | ICD-10-CM

## 2024-08-22 DIAGNOSIS — R05.1 ACUTE COUGH: ICD-10-CM

## 2024-08-22 DIAGNOSIS — R73.03 PREDIABETES: ICD-10-CM

## 2024-08-22 LAB
ALBUMIN SERPL-MCNC: 4.9 G/DL (ref 3.2–4.8)
ALBUMIN/GLOB SERPL: 1.8 {RATIO} (ref 1–2)
ALP LIVER SERPL-CCNC: 111 U/L
ALT SERPL-CCNC: 29 U/L
ANION GAP SERPL CALC-SCNC: 2 MMOL/L (ref 0–18)
AST SERPL-CCNC: 34 U/L (ref ?–34)
BASOPHILS # BLD AUTO: 0.08 X10(3) UL (ref 0–0.2)
BASOPHILS NFR BLD AUTO: 1.6 %
BILIRUB SERPL-MCNC: 0.2 MG/DL (ref 0.3–1.2)
BUN BLD-MCNC: 26 MG/DL (ref 9–23)
BUN/CREAT SERPL: 34.7 (ref 10–20)
CALCIUM BLD-MCNC: 10.4 MG/DL (ref 8.7–10.4)
CHLORIDE SERPL-SCNC: 108 MMOL/L (ref 98–112)
CHOLEST SERPL-MCNC: 212 MG/DL (ref ?–200)
CO2 SERPL-SCNC: 31 MMOL/L (ref 21–32)
CREAT BLD-MCNC: 0.75 MG/DL
DEPRECATED RDW RBC AUTO: 38.9 FL (ref 35.1–46.3)
EGFRCR SERPLBLD CKD-EPI 2021: 94 ML/MIN/1.73M2 (ref 60–?)
EOSINOPHIL # BLD AUTO: 0.35 X10(3) UL (ref 0–0.7)
EOSINOPHIL NFR BLD AUTO: 6.9 %
ERYTHROCYTE [DISTWIDTH] IN BLOOD BY AUTOMATED COUNT: 13.2 % (ref 11–15)
EST. AVERAGE GLUCOSE BLD GHB EST-MCNC: 126 MG/DL (ref 68–126)
FASTING PATIENT LIPID ANSWER: YES
FASTING STATUS PATIENT QL REPORTED: YES
GLOBULIN PLAS-MCNC: 2.7 G/DL (ref 2–3.5)
GLUCOSE BLD-MCNC: 85 MG/DL (ref 70–99)
HBA1C MFR BLD: 6 % (ref ?–5.7)
HCT VFR BLD AUTO: 41.6 %
HDLC SERPL-MCNC: 76 MG/DL (ref 40–59)
HGB BLD-MCNC: 13.5 G/DL
IMM GRANULOCYTES # BLD AUTO: 0.02 X10(3) UL (ref 0–1)
IMM GRANULOCYTES NFR BLD: 0.4 %
LDLC SERPL CALC-MCNC: 118 MG/DL (ref ?–100)
LYMPHOCYTES # BLD AUTO: 1.59 X10(3) UL (ref 1–4)
LYMPHOCYTES NFR BLD AUTO: 31.4 %
MCH RBC QN AUTO: 26.3 PG (ref 26–34)
MCHC RBC AUTO-ENTMCNC: 32.5 G/DL (ref 31–37)
MCV RBC AUTO: 81.1 FL
MONOCYTES # BLD AUTO: 0.59 X10(3) UL (ref 0.1–1)
MONOCYTES NFR BLD AUTO: 11.7 %
NEUTROPHILS # BLD AUTO: 2.43 X10 (3) UL (ref 1.5–7.7)
NEUTROPHILS # BLD AUTO: 2.43 X10(3) UL (ref 1.5–7.7)
NEUTROPHILS NFR BLD AUTO: 48 %
NONHDLC SERPL-MCNC: 136 MG/DL (ref ?–130)
OSMOLALITY SERPL CALC.SUM OF ELEC: 296 MOSM/KG (ref 275–295)
PLATELET # BLD AUTO: 346 10(3)UL (ref 150–450)
POTASSIUM SERPL-SCNC: 4.4 MMOL/L (ref 3.5–5.1)
PROT SERPL-MCNC: 7.6 G/DL (ref 5.7–8.2)
RBC # BLD AUTO: 5.13 X10(6)UL
SODIUM SERPL-SCNC: 141 MMOL/L (ref 136–145)
TRIGL SERPL-MCNC: 105 MG/DL (ref 30–149)
TSI SER-ACNC: 1.69 MIU/ML (ref 0.55–4.78)
VLDLC SERPL CALC-MCNC: 18 MG/DL (ref 0–30)
WBC # BLD AUTO: 5.1 X10(3) UL (ref 4–11)

## 2024-08-22 PROCEDURE — 85025 COMPLETE CBC W/AUTO DIFF WBC: CPT

## 2024-08-22 PROCEDURE — 99396 PREV VISIT EST AGE 40-64: CPT | Performed by: PHYSICIAN ASSISTANT

## 2024-08-22 PROCEDURE — 84443 ASSAY THYROID STIM HORMONE: CPT

## 2024-08-22 PROCEDURE — 3074F SYST BP LT 130 MM HG: CPT | Performed by: PHYSICIAN ASSISTANT

## 2024-08-22 PROCEDURE — 3078F DIAST BP <80 MM HG: CPT | Performed by: PHYSICIAN ASSISTANT

## 2024-08-22 PROCEDURE — 80053 COMPREHEN METABOLIC PANEL: CPT

## 2024-08-22 PROCEDURE — 36415 COLL VENOUS BLD VENIPUNCTURE: CPT

## 2024-08-22 PROCEDURE — 83036 HEMOGLOBIN GLYCOSYLATED A1C: CPT

## 2024-08-22 PROCEDURE — 3008F BODY MASS INDEX DOCD: CPT | Performed by: PHYSICIAN ASSISTANT

## 2024-08-22 PROCEDURE — 80061 LIPID PANEL: CPT

## 2024-08-22 RX ORDER — LOSARTAN POTASSIUM 50 MG/1
50 TABLET ORAL DAILY
Qty: 90 TABLET | Refills: 3 | Status: SHIPPED | OUTPATIENT
Start: 2024-08-22

## 2024-08-22 RX ORDER — METOPROLOL SUCCINATE 25 MG/1
25 TABLET, EXTENDED RELEASE ORAL DAILY
Qty: 90 TABLET | Refills: 3 | Status: SHIPPED | OUTPATIENT
Start: 2024-08-22

## 2024-08-22 RX ORDER — HYDROCHLOROTHIAZIDE 25 MG/1
25 TABLET ORAL DAILY
Qty: 90 TABLET | Refills: 3 | Status: SHIPPED | OUTPATIENT
Start: 2024-08-22

## 2024-08-22 RX ORDER — CODEINE PHOSPHATE AND GUAIFENESIN 10; 100 MG/5ML; MG/5ML
5 SOLUTION ORAL EVERY 6 HOURS PRN
Qty: 120 ML | Refills: 0 | Status: SHIPPED | OUTPATIENT
Start: 2024-08-22

## 2024-08-22 NOTE — PROGRESS NOTES
HPI:   Marjan Castro is a 53 year old female who presents for an Annual Health Visit.   The patient complains of productive cough. The patient is taking Amoxicillin at this time due to tooth abscess. The patient is scheduled with Dentist and Endodontist this afternoon.   The patient denies chest pain, SOB, N/V/C/D, fever, dizziness, syncope, and abdominal pain. There are no other concerns today.      Allergies:   No Known Allergies    CURRENT MEDICATIONS   Current Outpatient Medications   Medication Sig Dispense Refill    hydroCHLOROthiazide 25 MG Oral Tab Take 1 tablet (25 mg total) by mouth daily. 90 tablet 3    losartan 50 MG Oral Tab Take 1 tablet (50 mg total) by mouth daily. 90 tablet 3    metoprolol succinate ER 25 MG Oral Tablet 24 Hr Take 1 tablet (25 mg total) by mouth daily. 90 tablet 3    guaiFENesin-codeine (CHERATUSSIN AC) 100-10 MG/5ML Oral Solution Take 5 mL by mouth every 6 (six) hours as needed. 120 mL 0    WEGOVY 2.4 MG/0.75ML Subcutaneous Solution Auto-injector INJECT 0.75 ML (2.4 MG TOTAL) INTO THE SKIN ONCE A WEEK. 9 mL 0    semaglutide-weight management 1.7 MG/0.75ML Subcutaneous Solution Auto-injector Inject 0.75 mL (1.7 mg total) into the skin once a week. 9 mL 0    NON FORMULARY Take 1 tablet by mouth daily. [employer] brand of Zyrtec      sertraline 100 MG Oral Tab Take 2 tablets (200 mg total) by mouth every morning. 180 tablet 0    Fezolinetant (VEOZAH) 45 MG Oral Tab Take 45 mg by mouth daily. 90 tablet 3      HISTORICAL INFORMATION   Past Medical History:    Bleeding ulcer    Dumping syndrome    H/O gastric bypass    for weight loss    H/O laparoscopic adjustable gastric banding    removed 2003    History of blood transfusion    Hypertension    Obesity (BMI 30-39.9)    KRISTY on CPAP    PONV (postoperative nausea and vomiting)    Screen for colon cancer    repeat CLN in 2032    Sleep apnea    cpap      Past Surgical History:   Procedure Laterality Date    Adjustment gastric band        delivery only      Cholecystectomy      Colonoscopy      Colonoscopy N/A 2022    Procedure: COLONOSCOPY;  Surgeon: ALVINA Goldstein MD;  [facility]: [facility] ENDOSCOPY    Gastric bypass,obesity,sb reconstruc      Hernia surgery      Lap gastric bypass/yue-en-y  2003    [place] weight 225    Reduction left Left 1986    Reduction of large breast Bilateral     Reduction right Right 1986    Removal of ovary(s) Right       Family History   Problem Relation Age of Onset    PTSD Mother     Suicide History Maternal Grandmother         completd suicide    Suicide History Paternal Grandfather         completed suicide    Alcohol and Other Disorders Associated Sister     Substance Abuse Sister     Depression Sister     Anxiety Sister     Alcohol and Other Disorders Associated Brother     Depression Brother     Anxiety Brother     Alcohol and Other Disorders Associated Sister     Anxiety Sister     Depression Sister     Learning Disability Sister     Cancer Paternal Grandmother         colon cancer    Breast Cancer Neg     Ovarian Cancer Neg       SOCIAL HISTORY   Social History     Socioeconomic History    Marital status:    Tobacco Use    Smoking status: Every Day     Current packs/day: 2.00     Types: Cigarettes     Passive exposure: Current    Smokeless tobacco: Never    Tobacco comments:     2 PACK A WEEK   Vaping Use    Vaping status: Never Used   Substance and Sexual Activity    Alcohol use: Not Currently     Comment: Sober since     Drug use: Never     Social Determinants of Health      Received from [employer]    Stress    Received from [employer]    Social Network     Social History     Social History Narrative    Not on file        REVIEW OF SYSTEMS:     Review of Systems   Constitutional: Negative.    HENT: Negative.     Eyes: Negative.    Respiratory: Negative.     Cardiovascular: Negative.    Gastrointestinal: Negative.    Genitourinary:  Negative.    Musculoskeletal: Negative.    Skin: Negative.    Neurological: Negative.    Psychiatric/Behavioral: Negative.           EXAM:   /79   Pulse 86   Ht 4' 10.9\" (1.496 m)   Wt 134 lb (60.8 kg)   BMI 27.16 kg/m²    Wt Readings from Last 6 Encounters:   08/22/24 134 lb (60.8 kg)   04/10/24 131 lb 9.6 oz (59.7 kg)   02/14/24 134 lb 12.8 oz (61.1 kg)   01/02/24 135 lb 8 oz (61.5 kg)   10/12/23 142 lb (64.4 kg)   08/07/23 142 lb (64.4 kg)     Body mass index is 27.16 kg/m².    Physical Exam  Vitals reviewed.   Constitutional:       Appearance: She is well-developed.   HENT:      Head: Normocephalic and atraumatic.      Right Ear: Tympanic membrane, ear canal and external ear normal. There is no impacted cerumen.      Left Ear: Tympanic membrane, ear canal and external ear normal. There is no impacted cerumen.      Nose: Nose normal.      Mouth/Throat:      Mouth: Mucous membranes are moist.      Pharynx: Oropharynx is clear. No oropharyngeal exudate or posterior oropharyngeal erythema.   Eyes:      General:         Right eye: No discharge.         Left eye: No discharge.      Conjunctiva/sclera: Conjunctivae normal.   Cardiovascular:      Rate and Rhythm: Normal rate and regular rhythm.      Heart sounds: Normal heart sounds.   Pulmonary:      Effort: Pulmonary effort is normal.      Breath sounds: Normal breath sounds.   Abdominal:      General: Abdomen is flat. Bowel sounds are normal. There is no distension.      Palpations: Abdomen is soft.      Tenderness: There is no abdominal tenderness. There is no right CVA tenderness or left CVA tenderness.   Genitourinary:     Vagina: Normal.   Musculoskeletal:         General: Normal range of motion.      Cervical back: Normal range of motion and neck supple.   Skin:     Findings: No rash.   Neurological:      Mental Status: She is alert and oriented to person, place, and time.   Psychiatric:         Behavior: Behavior normal.         Thought Content:  Thought content normal.         Judgment: Judgment normal.          ASSESSMENT AND PLAN:   Marjan was seen today for routine physical.    Diagnoses and all orders for this visit:    Routine general medical examination at a health care facility  -     CBC With Differential With Platelet; Future  -     Comp Metabolic Panel (14); Future  -     Hemoglobin A1C; Future  -     Lipid Panel; Future  -     TSH W Reflex To Free T4; Future    Encounter for screening mammogram for breast cancer  -     [facility] GERARDO 2D+3D SCREENING BILAT (CPT=77067/33862); Future    Prediabetes  -     Hemoglobin A1C; Future    Acute cough  -     guaiFENesin-codeine (CHERATUSSIN AC) 100-10 MG/5ML Oral Solution; Take 5 mL by mouth every 6 (six) hours as needed.    Essential hypertension  -     hydroCHLOROthiazide 25 MG Oral Tab; Take 1 tablet (25 mg total) by mouth daily.  -     losartan 50 MG Oral Tab; Take 1 tablet (50 mg total) by mouth daily.  -     metoprolol succinate ER 25 MG Oral Tablet 24 Hr; Take 1 tablet (25 mg total) by mouth daily.      Overall health discussed, exercise/activity appropriate for age and health status, heathy diety, preventive care, and upcoming screening discussed. Routine labs ordered.    There are no Patient Instructions on file for this visit.    The patient indicates understanding of these issues and agrees to the plan.    Problem List:  Patient Active Problem List   Diagnosis    Major depressive disorder, recurrent episode, moderate (HCC)    NINO (generalized anxiety disorder)    PTSD (post-traumatic stress disorder)    Alcohol abuse, episodic    History of gastric bypass    Internal hemorrhoids    Obesity (BMI 30-39.9)    Weight gain    Mild protein-calorie malnutrition (HCC)    Dyslipidemia    Pre-diabetes    KRISTY on CPAP    Abnormal Pap smear of cervix    Calculus of ureter    Dehydration    Easy bruising    Full incontinence of feces    Hematuria    Kidney stone    Iron (Fe) deficiency anemia    Iron adverse  reaction, sequela    MGUS (monoclonal gammopathy of unknown significance)    Morbid obesity (HCC)    Murmur, heart    Renal colic on left side    Ulcer    Vitamin B 12 deficiency    Amnesia    Trigeminal autonomic cephalgias    Overweight (BMI 25.0-29.9)    Essential hypertension       Dorina Ramirez PA-C  8/22/2024  8:55 AM

## 2024-08-26 ENCOUNTER — APPOINTMENT (OUTPATIENT)
Dept: OTHER | Facility: HOSPITAL | Age: 54
End: 2024-08-26
Attending: EMERGENCY MEDICINE

## 2024-09-10 ENCOUNTER — HOSPITAL ENCOUNTER (OUTPATIENT)
Dept: GENERAL RADIOLOGY | Facility: HOSPITAL | Age: 54
Discharge: HOME | End: 2024-09-10
Attending: PHYSICAL MEDICINE & REHABILITATION
Payer: COMMERCIAL

## 2024-09-10 ENCOUNTER — OFFICE VISIT (OUTPATIENT)
Dept: PHYSICAL MEDICINE AND REHAB | Facility: CLINIC | Age: 54
End: 2024-09-10
Payer: OTHER MISCELLANEOUS

## 2024-09-10 ENCOUNTER — HOSPITAL ENCOUNTER (OUTPATIENT)
Dept: GENERAL RADIOLOGY | Facility: HOSPITAL | Age: 54
Discharge: HOME | End: 2024-09-10
Attending: PHYSICAL MEDICINE & REHABILITATION
Payer: OTHER MISCELLANEOUS

## 2024-09-10 VITALS — HEIGHT: 58.9 IN | BODY MASS INDEX: 27 KG/M2

## 2024-09-10 DIAGNOSIS — M79.18 MYOFASCIAL PAIN: Primary | ICD-10-CM

## 2024-09-10 DIAGNOSIS — M54.50 ACUTE BILATERAL LOW BACK PAIN WITHOUT SCIATICA: ICD-10-CM

## 2024-09-10 DIAGNOSIS — M79.18 MYOFASCIAL PAIN: ICD-10-CM

## 2024-09-10 PROCEDURE — 72100 X-RAY EXAM L-S SPINE 2/3 VWS: CPT | Performed by: PHYSICAL MEDICINE & REHABILITATION

## 2024-09-10 PROCEDURE — 72072 X-RAY EXAM THORAC SPINE 3VWS: CPT | Performed by: PHYSICAL MEDICINE & REHABILITATION

## 2024-09-10 PROCEDURE — 99204 OFFICE O/P NEW MOD 45 MIN: CPT | Performed by: PHYSICAL MEDICINE & REHABILITATION

## 2024-09-10 RX ORDER — METHYLPREDNISOLONE 4 MG
TABLET, DOSE PACK ORAL
Qty: 1 EACH | Refills: 0 | Status: SHIPPED | OUTPATIENT
Start: 2024-09-10

## 2024-09-10 NOTE — H&P
History and Physical    C/C:   Chief Complaint   Patient presents with    New Patient     New patient, right hand dominant, here for left shoulder pain, constant, right mid-thoracic pain and also bilateral low back pain that travels down the right leg. Pain started first week of July. Denies any accidents or injury. Pain is sharp, with numbness and tingling in left shoulder. Nothing aggravates, it just comes and goes and in the shoulder is constant. Patient on PT (worsening the pain). Pain rated 05/10. Patient takes Tylenol as needed (helping). Denies any imaging.      HPI: 53-year-old female presents with work-related injury.  She works as a surgical assistant, and on 7/17/2024 began having right thoracolumbar pain, left upper trapezial pain, and low back pain in a band like pattern across the lower back. Seen by occupational health, referred to physical therapy, and referred to this clinic.    Has a history of lower back pain that resolved with PT that incorporated cupping when she used to work in either [facility] [facility] [facility] [employer] or [place]. That was years ago around [id number].     More recently began having back spasms that did not dissipate. Has had sharp pain in the thoracic spine. Seen by Dr. Odom in occupational health, put on light duty. Began physical therapy, which during the PT she has found to be quite painful.  Has done about 3 sessions so far.  Has been having dry needling as part of her modality. Currently doing PT at [facility]. Less than a week ago developed 1 instance of pain radiating into the right leg that has since resolved. Localizes pain in the left upper traepzial region, right thoracolumbar region, and lower lumbar region. Low back pain seems to worsen with standing, improves with walking.     Technically on light duty restrictions, but has been off work since the last week of August as there is no light duty work for her.     Has a black eye; bed frame fell into her face while assembling a  bed frame this weekend.     Allergies: No Known Allergies     Patient-reported ROS   Sleep Disturbance: admits  Chills: denies  Fever: denies  Weight Gain: denies  Weight Loss: denies   Cardiovascular  Irregular Heartbeat: denies   Respiratory  Painful Breathing: denies  Wheezing: denies   Gastrointestinal  Bowel Incontinence: denies  Heartburn: denies  Abdominal Pain: denies  Blood in Stool : denies  Rectal Pain: denies   Hematology  Easy Bruising: admits  Easy Bleeding: denies   Genitourinary  Difficulty Urinating: denies  Bladder Incontinence: denies  Pelvic Pain: denies  Painful Urination: denies   Musculoskeletal  Painful Joints: admits  Tailbone Pain: denies  Swollen Joints: denies   Peripheral Vascular  Swelling of Legs/Feet: denies  Cold Extremities: denies   Skin  Open Sores: denies  Nodules or Lumps: denies  Rash: denies   Neurological  Loss of Strength Since last Visit: admits  Tingling/Numbness: admits  Balance: denies   Psychiatric  Anxiety: admits  Depressed Mood: admits      Physical exam:  Ht 58.9\"   BMI 27.16 kg/m²     Cervical spine exam:    No neck rash  No ttp cervical paraspinals bilaterally. + ttp upper trapezius left  Cervical extension 50 degrees. Cervical flexion 50 degrees. Cervical rotation to the right 75 degrees. Cervical rotation to the left 75 degrees  Spurling's test to the left negative  5/5 strength in shoulder abduction, elbow flexion, elbow extension, wrist extension, finger flexion, FDI bilaterally  SILT b/l UE C5-T1 distributions  2/4 biceps, brachioradialis, triceps reflexes b/l  Thacker test negative    Ttp right lower thoracic, less so upper lumbar paraspinals    Lumbar spine exam:    No skin rash lumbar/upper sacral region  No pain with lumbar flexion. + pain with lumbar extension.  + tenderness to palpation bilateral lower lumbar paraspinals. No ttp bilateral PSIS.  5/5 LE strength b/l in HF, KE, ADF, EHL, ankle eversion  SILT b/l LE  2/4 quad, gastrocs reflexes  b/l  Seated slump test negative  SLR negative b/l  Normal range of motion of both hips in flexion, internal rotation    Imaging: No imaging to review    Assessment and plan:  Myofascial pain  Subacute low back pain, for the most part axial with one episode of radiation into the right leg  H/o gastric bypass; cannot be on NSAIDs  NINO and PTSD    Recommend she continue physical therapy for myofascial techniques, neutral to flexion lumbar stabilization. Medrol dosepack prescribed. Cannot be on NSAIDs given history of gastric bypass. XR thoracic and lumbar spine ordered. Follow up in 3-4 weeks. Continue light duty restrictions in the interim.     Nain Malcolm DO  Physical Medicine and Rehabilitation  [facility]

## 2024-09-11 ENCOUNTER — TELEPHONE (OUTPATIENT)
Dept: PHYSICAL MEDICINE AND REHAB | Facility: CLINIC | Age: 54
End: 2024-09-11

## 2024-09-11 NOTE — TELEPHONE ENCOUNTER
Office visit notes from Sept 10th were faxed to the adjustor Shavon Coffey at [facility] @[phone]

## 2024-09-16 ENCOUNTER — PATIENT MESSAGE (OUTPATIENT)
Dept: PHYSICAL MEDICINE AND REHAB | Facility: CLINIC | Age: 54
End: 2024-09-16

## 2024-09-16 ENCOUNTER — TELEPHONE (OUTPATIENT)
Dept: PHYSICAL MEDICINE AND REHAB | Facility: CLINIC | Age: 54
End: 2024-09-16

## 2024-09-16 DIAGNOSIS — M79.18 MYOFASCIAL PAIN: ICD-10-CM

## 2024-09-16 DIAGNOSIS — M54.50 ACUTE BILATERAL LOW BACK PAIN WITHOUT SCIATICA: Primary | ICD-10-CM

## 2024-09-16 NOTE — TELEPHONE ENCOUNTER
Received ADA Medical Assessment Form from [facility] via fax. Sent to Forms Dept by mail & email.

## 2024-09-17 NOTE — TELEPHONE ENCOUNTER
Per Dr. Malcolm's LOV note: \"Recommend she continue physical therapy for myofascial techniques, neutral to flexion lumbar stabilization. Medrol dosepack prescribed. Cannot be on NSAIDs given history of gastric bypass. XR thoracic and lumbar spine ordered. Follow up in 3-4 weeks. Continue light duty restrictions in the interim.\"      Also patient sent secondary message stating requested pharmacy:   \"[address] [address]     Tel # [phone]     This should be billed under worker comp\"         Will forward pt's medication requests to Dr. Malcolm for his review/recommendations.

## 2024-09-17 NOTE — TELEPHONE ENCOUNTER
From: Marjan Castro  To: Nain Malcolm  Sent: 2024 1:41 PM CDT  Subject: Pain meds Rx    Hi I was wondering if the doctor could send an Rx to [facility] in the [facility] in [place] for  Toradol   And 5% Lidocaine patches  I have 2 separate areas I put the patches on my back so I’ll need several boxes    Thank you,   Marjan Castro    10/03/1970  [phone]

## 2024-09-17 NOTE — TELEPHONE ENCOUNTER
From: Marjan Castro  To: Nain Malcolm  Sent: 9/16/2024 1:47 PM CDT  Subject: Pharmacy location     [place] [address] [facility] [address]    Tel # [phone]    This should be billed under worker comp

## 2024-09-19 ENCOUNTER — TELEPHONE (OUTPATIENT)
Dept: PHYSICAL MEDICINE AND REHAB | Facility: CLINIC | Age: 54
End: 2024-09-19

## 2024-09-19 RX ORDER — METHYLPREDNISOLONE 4 MG
TABLET, DOSE PACK ORAL
Qty: 1 EACH | Refills: 0 | Status: SHIPPED | OUTPATIENT
Start: 2024-09-19

## 2024-09-19 RX ORDER — LIDOCAINE 50 MG/G
1 PATCH TOPICAL EVERY 24 HOURS
Qty: 30 PATCH | Refills: 0 | Status: SHIPPED | OUTPATIENT
Start: 2024-09-19

## 2024-09-19 NOTE — TELEPHONE ENCOUNTER
Incoming call from patient reporting that her pain/lidocaine placement would be: Left shoulder & Right Thoracic Area. Orders placed per-protocol with co-sign required.

## 2024-09-19 NOTE — TELEPHONE ENCOUNTER
Toradol should be avoided given the history of gastric bypass. Medrol dosepack would be more appropriate. Can prescribe lidocaine patches.

## 2024-09-20 ENCOUNTER — TELEPHONE (OUTPATIENT)
Dept: PHYSICAL MEDICINE AND REHAB | Facility: CLINIC | Age: 54
End: 2024-09-20

## 2024-09-20 NOTE — TELEPHONE ENCOUNTER
Received denial for lidocaine 5% adh patch from [facility] via fax. Placed in RN folder.

## 2024-09-25 ENCOUNTER — TELEPHONE (OUTPATIENT)
Dept: PHYSICAL MEDICINE AND REHAB | Facility: CLINIC | Age: 54
End: 2024-09-25

## 2024-09-25 NOTE — TELEPHONE ENCOUNTER
Americans with Disabilities Act form received in forms dept. Logged for processing. Sent [employer] message for authorization.

## 2024-09-26 NOTE — TELEPHONE ENCOUNTER
Called patient to obtain details. Advised patient to complete Release of Information patient expressed understanding.   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

## 2024-09-27 NOTE — TELEPHONE ENCOUNTER
Dr. Malcolm,    Please sign off on form if you agree to:     -Signature page will be the first page scanned  -From your Inbasket, Highlight the patient and click Chart   -Double click the 9/25/24 Forms Completion telephone encounter  -Scroll down to the Media section   -Click the blue Hyperlink: Americans with Disabilities Act  9/26/24  -Click Acknowledge located in the top right ribbon/menu   -Drag the mouse into the blank space of the document and a + sign will appear. Left click to   electronically sign the document.  -Once signed, simply exit out of the screen and you signature will be saved.     Thank you,     Selena

## 2024-10-01 NOTE — TELEPHONE ENCOUNTER
Valid Release of Information received via [employer] message. Forms completed and faxed to [facility] fax:[phone]. [employer] message sent to patient.

## 2024-10-03 ENCOUNTER — PATIENT MESSAGE (OUTPATIENT)
Dept: PHYSICAL MEDICINE AND REHAB | Facility: CLINIC | Age: 54
End: 2024-10-03

## 2024-10-03 DIAGNOSIS — M79.18 MYOFASCIAL PAIN: Primary | ICD-10-CM

## 2024-10-03 DIAGNOSIS — M54.50 ACUTE BILATERAL LOW BACK PAIN WITHOUT SCIATICA: ICD-10-CM

## 2024-10-07 NOTE — TELEPHONE ENCOUNTER
Patient is asking about cupping being added to her current PT order.  Stated that [facility] was going to be hiring someone first of October- now say that they did hire someone but going through training and won't be seeing patients until December.    Patient is switching to another [facility] location- need new PT order     [facility] on [address]- need new referral for [facility]    Deep therapy massage and cupping.     5% lido patches are not helping the shoulder or back- has appt 10/30  The biofreeze is working but for very short time.     Order pended and routed to Dr. Malcolm- added cupping and deep tissue massage to comments- Dr. Malcolm to review and make any needed changes.     Patient is requesting the order be sent to her MyChart-please route back to nursing  so this may be copied and pasted into letter to be sent to patient.

## 2024-10-07 NOTE — TELEPHONE ENCOUNTER
See other [facility] encounter dated 10/03/2024-this was addressed on.     From: Marjan Castro  To: Nain Malcolm  Sent: 10/3/2024  3:37 PM CDT  Subject: Update PT referral.     I have decided to seek another facility for a PT therapy called “Cupping’ . And deep tissue massage.   [facility] facilities are required to have any specialty therapies stated on the referral.     Can you update the referral to include “cupping “ and deep tissue massage.     You can upload it in my chart if you want.     Thank you in advance,   Marjan Castro    10//1970  Cell [phone]

## 2024-10-10 ENCOUNTER — TELEPHONE (OUTPATIENT)
Dept: PHYSICAL MEDICINE AND REHAB | Facility: CLINIC | Age: 54
End: 2024-10-10

## 2024-10-10 NOTE — TELEPHONE ENCOUNTER
Pt requesting to speak with clinical staff. Would like to know if she can get pain injection for upper bilateral back. Please advise, thank you.

## 2024-10-11 ENCOUNTER — HOSPITAL ENCOUNTER (EMERGENCY)
Facility: HOSPITAL | Age: 54
Discharge: HOME | End: 2024-10-11
Attending: EMERGENCY MEDICINE
Payer: OTHER MISCELLANEOUS

## 2024-10-11 VITALS
WEIGHT: 130 LBS | SYSTOLIC BLOOD PRESSURE: 105 MMHG | TEMPERATURE: 99 F | BODY MASS INDEX: 26.21 KG/M2 | RESPIRATION RATE: 16 BRPM | HEART RATE: 70 BPM | OXYGEN SATURATION: 98 % | HEIGHT: 59 IN | DIASTOLIC BLOOD PRESSURE: 79 MMHG

## 2024-10-11 DIAGNOSIS — S46.812A STRAIN OF LEFT TRAPEZIUS MUSCLE, INITIAL ENCOUNTER: Primary | ICD-10-CM

## 2024-10-11 PROCEDURE — 96372 THER/PROPH/DIAG INJ SC/IM: CPT

## 2024-10-11 PROCEDURE — 99284 EMERGENCY DEPT VISIT MOD MDM: CPT

## 2024-10-11 PROCEDURE — 99283 EMERGENCY DEPT VISIT LOW MDM: CPT

## 2024-10-11 RX ORDER — KETOROLAC TROMETHAMINE 30 MG/ML
60 INJECTION, SOLUTION INTRAMUSCULAR; INTRAVENOUS ONCE
Status: COMPLETED | OUTPATIENT
Start: 2024-10-11 | End: 2024-10-11

## 2024-10-11 RX ORDER — ORPHENADRINE CITRATE 100 MG/1
100 TABLET ORAL 2 TIMES DAILY
Qty: 20 TABLET | Refills: 0 | Status: SHIPPED | OUTPATIENT
Start: 2024-10-11 | End: 2024-10-18

## 2024-10-11 RX ORDER — HYDROCODONE BITARTRATE AND ACETAMINOPHEN 5; 325 MG/1; MG/1
1 TABLET ORAL EVERY 6 HOURS PRN
Qty: 16 TABLET | Refills: 0 | Status: SHIPPED | OUTPATIENT
Start: 2024-10-11 | End: 2024-10-18

## 2024-10-11 NOTE — ED PROVIDER NOTES
Patient Seen in: [facility] Emergency Department    History     Chief Complaint   Patient presents with    Back Pain       HPI    54-year-old female presents to the ED for evaluation of left upper back pain radiating into her left neck.  Patient states that she has been getting physical therapy and has been seeing PM&R for right thoracic back pain and bilateral upper back pain.  After getting dry needling done over the last 2 weeks of physical therapy, she has had severe pain worse in the left side.  She today had an episode of severe pain that radiated into her neck up to her head prompting her to come to the ED.  She states that she has been feeling spasming.  No numbness, tingling.  She states she has been using lidocaine patches without relief.    History from Independent Source:       External Records Reviewed: Reviewed prior records available in EMR.  Patient last saw Dr. Malcolm on the  of last month.  He had diagnosed patient with myofascial pain and wanted her to continue with physical therapy.    History reviewed.   Past Medical History:    Anxiety    Bleeding ulcer    Depression    Dumping syndrome    Essential hypertension    H/O gastric bypass    for weight loss    H/O laparoscopic adjustable gastric banding    removed     History of blood transfusion    Hyperlipidemia    Hypertension    Obesity (BMI 30-39.9)    KRISTY on CPAP    PONV (postoperative nausea and vomiting)    Screen for colon cancer    repeat CLN in     Sleep apnea    cpap       History reviewed.   Past Surgical History:   Procedure Laterality Date    Adjustment gastric band       delivery only      Cholecystectomy      Colonoscopy      Colonoscopy N/A 2022    Procedure: COLONOSCOPY;  Surgeon: ALVINA Goldstein MD;  Location: [facility] ENDOSCOPY    Gastric bypass,obesity,sb reconstruc      Hernia surgery      Lap gastric bypass/yue-en-y  2003    [place] surgical weight 225    Reduction left Left 1986    Reduction of  large breast Bilateral     Reduction right Right     Removal of ovary(s) Right          Medications :  Prescriptions Prior to Admission[1]     Family History   Problem Relation Age of Onset    PTSD Mother     Hypertension Mother     Suicide History Maternal Grandmother         completd suicide    Suicide History Paternal Grandfather         completed suicide    Alcohol and Other Disorders Associated Sister     Substance Abuse Sister     Depression Sister     Anxiety Sister     Alcohol and Other Disorders Associated Brother     Depression Brother     Anxiety Brother     Alcohol and Other Disorders Associated Sister     Anxiety Sister     Depression Sister     Learning Disability Sister     Diabetes Father     Heart Attack Father     Heart Disease Father     Stroke Father     Cancer Paternal Grandmother         colon cancer    Breast Cancer Neg     Ovarian Cancer Neg        Smoking Status:   Social History     Socioeconomic History    Marital status:    Tobacco Use    Smoking status: Every Day     Current packs/day: 0.00     Types: Cigarettes     Last attempt to quit: 1985     Years since quittin.9     Passive exposure: Current    Smokeless tobacco: Never    Tobacco comments:     Stopped , started again 2021, stopped 2022   Vaping Use    Vaping status: Never Used   Substance and Sexual Activity    Alcohol use: Not Currently     Comment: Sober since     Drug use: Never       Constitutional and vital signs reviewed.      Social History and Family History elements reviewed from today, pertinent positives to the presenting problem noted.    Physical Exam     ED Triage Vitals [10/11/24 1347]   /81   Pulse 108   Resp 18   Temp 98.7 °F (37.1 °C)   Temp src    SpO2 98 %   O2 Device        Physical Exam   Constitutional: AAOx3, well nourished, NAD  HEENT: Normocephalic, PERRLA, MMM  CV: s1s2+, RRR, no m/r/g, normal distal pulses  Pulmonary/Chest: CTA b/l with no rales,  wheezes.  No chest wall tenderness  Abdominal: Nontender.  Nondistended. Soft. Bowel sounds are normal.   Neck/Back: Tenderness to palpation of right greater than left trapezius muscles.  Full range of motion of the neck  :   Musculoskeletal: Normal range of motion. No deformity.   Neurological: Awake, alert. Normal reflexes. No cranial nerve deficit.    Skin: Skin is warm and dry. No rash noted. No erythema.   Psychiatric:      All measures to prevent infection transmission during my interaction with the patient were taken. The patient was already wearing a droplet mask on my arrival to the room. Personal protective equipment was worn throughout the duration of the exam.      ED Course      Labs Reviewed - No data to display  My Independent Interpretation of EKG (if performed):     Imaging Results Available and Reviewed while in ED: No results found.  ED Medications Administered:   Medications   ketorolac (Toradol) 60 MG/2ML IM injection 60 mg (has no administration in time range)             MDM     Vitals:    10/11/24 1347   BP: 115/81   Pulse: 108   Resp: 18   Temp: 98.7 °F (37.1 °C)   SpO2: 98%   Weight: 59 kg   Height: 149.9 cm (4' 11\")     *I personally reviewed and interpreted all ED vitals.    Independent Interpretation of Studies:     Social Determinants of Health:     Procedures:      Differential/MDM/Shared Decision Making: Differential Diagnosis includes trapezius strain, degenerative disc disease, CVA, others.      The patient already  has a past medical history of Anxiety, Bleeding ulcer, Depression, Dumping syndrome, Essential hypertension, H/O gastric bypass (02/2006), H/O laparoscopic adjustable gastric banding (2003), History of blood transfusion, Hyperlipidemia, Hypertension, Obesity (BMI 30-39.9), KRISTY on CPAP, PONV (postoperative nausea and vomiting), Screen for colon cancer (2022), and Sleep apnea.  to contribute to the complexity of this ED evaluation.           Medications, Diagnostics, or  Disposition considered but not done: Diagnostic imaging    Using shared decision making, we will hold off on any imaging studies.  Patient is not having any radiation of pain at this time but I believe likely had some muscle spasm of her left trapezius muscle and the cervical portion of the muscle.  Management of case was discussed with patient and will place on Norflex and [place].  Patient cannot have NSAIDs due to previous gastric gastric bypass.  Patient has an appointment to see Dr. Malcolm on Monday.      Condition upon leaving the department: Stable    Disposition and Plan     Clinical Impression:  1. Strain of left trapezius muscle, initial encounter        Disposition:  Discharge    Follow-up:  Nain Malcolm,   [address] [facility] [address]  [facility] [address]  [phone]    Call in 1 day(s)        Medications Prescribed:  Current Discharge Medication List        START taking these medications    Details   orphenadrine  MG Oral Tablet 12 Hr Take 100 mg by mouth 2 (two) times daily for 7 days.  Qty: 20 tablet, Refills: 0    Associated Diagnoses: Strain of left trapezius muscle, initial encounter      HYDROcodone-acetaminophen 5-325 MG Oral Tab Take 1 tablet by mouth every 6 (six) hours as needed for Pain.  Qty: 16 tablet, Refills: 0    Associated Diagnoses: Strain of left trapezius muscle, initial encounter                      [1] (Not in a hospital admission)

## 2024-10-11 NOTE — ED INITIAL ASSESSMENT (HPI)
Patient reports back spasms, currently on light duty at work due to it. Advised by [facility] doctor to go to ER. Patient reports lidocaine patches do not help.

## 2024-10-22 ENCOUNTER — TELEPHONE (OUTPATIENT)
Dept: PHYSICAL THERAPY | Facility: HOSPITAL | Age: 54
End: 2024-10-22

## 2024-10-22 ENCOUNTER — HOSPITAL ENCOUNTER (OUTPATIENT)
Dept: GENERAL RADIOLOGY | Facility: HOSPITAL | Age: 54
Discharge: HOME | End: 2024-10-22
Attending: PHYSICAL MEDICINE & REHABILITATION
Payer: OTHER MISCELLANEOUS

## 2024-10-22 ENCOUNTER — OFFICE VISIT (OUTPATIENT)
Dept: PHYSICAL MEDICINE AND REHAB | Facility: CLINIC | Age: 54
End: 2024-10-22
Payer: OTHER MISCELLANEOUS

## 2024-10-22 DIAGNOSIS — M79.18 MYOFASCIAL PAIN: Primary | ICD-10-CM

## 2024-10-22 DIAGNOSIS — M54.12 RADICULITIS OF LEFT CERVICAL REGION: ICD-10-CM

## 2024-10-22 DIAGNOSIS — M79.18 MYOFASCIAL PAIN: ICD-10-CM

## 2024-10-22 PROCEDURE — 72050 X-RAY EXAM NECK SPINE 4/5VWS: CPT | Performed by: PHYSICAL MEDICINE & REHABILITATION

## 2024-10-22 PROCEDURE — 99214 OFFICE O/P EST MOD 30 MIN: CPT | Performed by: PHYSICAL MEDICINE & REHABILITATION

## 2024-10-22 RX ORDER — KETOROLAC TROMETHAMINE 10 MG/1
10 TABLET, FILM COATED ORAL EVERY 6 HOURS PRN
COMMUNITY
Start: 2024-09-16

## 2024-10-22 RX ORDER — HYDROCODONE BITARTRATE AND ACETAMINOPHEN 5; 325 MG/1; MG/1
1 TABLET ORAL EVERY 6 HOURS PRN
COMMUNITY

## 2024-10-22 RX ORDER — GABAPENTIN 300 MG/1
CAPSULE ORAL
Qty: 60 CAPSULE | Refills: 0 | Status: SHIPPED | OUTPATIENT
Start: 2024-10-22 | End: 2024-10-24

## 2024-10-22 NOTE — PROGRESS NOTES
Progress note    C/C:   Chief Complaint   Patient presents with    Back Pain     LOV: 9/10/2024 patient is here post-ED visit (was to ER on 10/11/2024) pain is now bilateral and worsening. Current pain 9/10 int starts with stiulator type feeling and will suddenly clench- patient prescribed Oxycodone- doesn't like because makes drowsy next day. States toradol provided in ER only lasted two hours. Feels bruised & sore from the spasms. Last PT on 10/9/24- had dry needling performed and end up in ER- went downhill after that. Had only two sessions of dry needling.      HPI: 54 year old female presents for follow up. Was in physical therapy, had dry needling that helped significantly with the lower back pain; the majority of the lower back pain had gone away. She was expecting to have cupping by a physical therapist she was told was going to be starting in October but never ended up working with that person. Had dry needling in the upper trapezius that made symptoms worse.     Currently working reception for light duty work. Had to leave work due to radiating pain from the upper trapezius to the lateral aspect of the neck. Went to the ER; was given toradol, norco and orphenadrine, discharged home. Describes electric, shooting pain into the left upper trapezial region. Orphenadrine takes the sharpness off, but the left upper trapezial region continues to feel sore and bruised. Last PT session on October 12th. Since the last time I saw her has done 11 sessions of physical therapy. Is wanting to pursue physical therapy through [facility] where they have a PT who does cupping, but cannot start there until December. Seems to be no rhyme or reason as to when the left upper trapezius pain is going to worsen. No n/t in the left arm or hand.     Pertinent allergies: Allergies[1]     Physical exam:  There were no vitals taken for this visit.     Cervical spine exam:    No neck rash  + ttp cervical paraspinals left. + ttp upper trapezius  left  Cervical extension 50 degrees. Cervical flexion 50 degrees. Cervical rotation to the right 75 degrees. Cervical rotation to the left 75 degrees  Spurling's test left negative  5/5 strength in shoulder abduction, elbow flexion, elbow extension, wrist extension, finger flexion, FDI bilaterally  SILT b/l UE C5-T1 distributions  2/4 biceps, brachioradialis, triceps reflexes b/l  Thacker test negative b/l    Imaging: XR thoracic spine dated 9/10/2024 independently reviewed, as was report. Bridging osteophytosis at T5-T6 and T6-T7. Otherwise normal.    XR lumbar spine dated 9/10/2024 also independently reviewed, as was the report, and the x-rays are essentially normal.    Assessment and plan  Cervical myofascial pain syndrome  Axial low back pain, resolved  History of gastric bypass; cannot be on NSAIDs  Generalized anxiety disorder and PTSD    My suspicion is that her symptoms are myofascial in nature, but should rule out a structural disorder resulting in neck and upper trapezial pain.  I recommend x-ray and MRI of the cervical spine given the persistent neck pain despite physical therapy.      From a practical standpoint MRIs have taken months to obtain.  Ideally it would be best to do the imaging first and then proceed with trigger point injections if no pathology seen that would change the plan.  Given long MRI wait times it may be more feasible to do trigger point injections to the left cervical paraspinals and upper trapezial regions and then have the MRI done if the symptoms persist.    I also referred her to physical therapy with one of our KISHAN therapists at the [facility] who are well versed in spine disorders.     In the meantime she will start gabapentin 100mg, and increase by 100mg weekly until she sees a total dose of 200mg BID or until she reaches a lower dose that works for her. Titration instructions written on prescription.    She will take off work this week, and then resume light duty on monday,  10/28.    Nain Malcolm DO  Physical Medicine and Rehabilitation  [facility]         [1]   Allergies  Allergen Reactions    Biofreeze RASH

## 2024-10-22 NOTE — PATIENT INSTRUCTIONS
1) Ideally we would have the MRI of your neck before doing the trigger point injections. From a practical standpoint if it takes a long time to get the MRI of the neck I would be open to doing the trigger point injections, assessing the outcome and then having you do the MRI of the neck if it is still needed.   2) Start the gabapentin as prescribed.   3) Take off the rest of the week, resume work this upcoming Monday.   4) Start physical therapy as ordered.

## 2024-10-23 ENCOUNTER — TELEPHONE (OUTPATIENT)
Dept: PHYSICAL MEDICINE AND REHAB | Facility: CLINIC | Age: 54
End: 2024-10-23

## 2024-10-23 ENCOUNTER — TELEPHONE (OUTPATIENT)
Dept: PHYSICAL THERAPY | Facility: HOSPITAL | Age: 54
End: 2024-10-23

## 2024-10-23 NOTE — TELEPHONE ENCOUNTER
Initiated authorization for Left cervical paraspinal and upper trapezius trigger point injections. CPT/HCPCS 10707, 83549,  dx:M79.18/M54.12      Clinicals and order have been faxed to the patient W/C agent:  Shavon Thomas [facility] [address] [place] [address]# [phone]   fax# [phone]   DOI 07/01/2024   Claim# -[id number]   Status: Pending

## 2024-10-24 ENCOUNTER — TELEPHONE (OUTPATIENT)
Dept: PHYSICAL THERAPY | Facility: HOSPITAL | Age: 54
End: 2024-10-24

## 2024-10-24 ENCOUNTER — OFFICE VISIT (OUTPATIENT)
Dept: PHYSICAL THERAPY | Facility: HOSPITAL | Age: 54
End: 2024-10-24
Attending: PHYSICAL MEDICINE & REHABILITATION
Payer: OTHER MISCELLANEOUS

## 2024-10-24 DIAGNOSIS — M79.18 MYOFASCIAL PAIN: Primary | ICD-10-CM

## 2024-10-24 DIAGNOSIS — M54.12 RADICULITIS OF LEFT CERVICAL REGION: ICD-10-CM

## 2024-10-24 PROCEDURE — 97162 PT EVAL MOD COMPLEX 30 MIN: CPT

## 2024-10-24 PROCEDURE — 97110 THERAPEUTIC EXERCISES: CPT

## 2024-10-24 RX ORDER — GABAPENTIN 100 MG/1
CAPSULE ORAL
Qty: 120 CAPSULE | Refills: 0 | Status: SHIPPED | OUTPATIENT
Start: 2024-10-24

## 2024-10-24 NOTE — PROGRESS NOTES
CERVICAL SPINE EVALUATION:   Marjan Castro    10/3/1970  Diagnosis: Myofascial pain (M79.18)  Radiculitis of left cervical region (M54.12)  Referring Physician:  Nain Morrow MD visit: none  Initial Evaluation Date: 10/24/2024  Date of Surgery: None for this diagnosis  Insurance: Workers Comp  Authorized # of visits:  4 by 2025  Plan of care to: 2025    Precautions/Hx: anxiety, bleeding ulcer, depression, dumping syndrome, HTN, gastric banding and then bypass, HLD, sleep apnea, c-sec, cholecystectomy, breast reduction, R ovarian surg due to cyst, incisional hernia    Past medical history was reviewed with Marjan.   Past Medical History:    Anxiety    Bleeding ulcer    Depression    Dumping syndrome    Essential hypertension    H/O gastric bypass    for weight loss    H/O laparoscopic adjustable gastric banding    removed     History of blood transfusion    Hyperlipidemia    Hypertension    Obesity (BMI 30-39.9)    KRISTY on CPAP    PONV (postoperative nausea and vomiting)    Screen for colon cancer    repeat CLN in     Sleep apnea    cpap     Past Surgical History:   Procedure Laterality Date    Adjustment gastric band       delivery only      Cholecystectomy      Colonoscopy      Colonoscopy N/A 2022    Procedure: COLONOSCOPY;  Surgeon: ALVINA Goldstein MD;  Location: [facility] ENDOSCOPY    Gastric bypass,obesity,sb reconstruc      Hernia surgery      Lap gastric bypass/yue-en-y  2003    [place] surgical weight 225    Reduction left Left     Reduction of large breast Bilateral     Reduction right Right     Removal of ovary(s) Right         SUBJECTIVE:   PATIENT SUMMARY:  Marjan Castro is a 54 year old y/o female who presents to therapy today with complaints of neck pain that radiates to the lateral neck and down to the parascapular area and also up to the lat sup neck. Pain will travel across the base of the neck to both shoulders.  Pt notes that she did cross fit and  was not able to get her abdominal core to gain well.  She notes that the exercise helped her physically and mentally.   She also has a separate area of pain in the lower post ribs that previously was sharp, but improved w dry needling.  Pt is now dull in the area. She had trial of dry needling in the L cervical/shoulder area which severely worsened her pain such that she went to the ER.  History of current condition: mid-July insidious onset.  She was sick with the flu in August and hoped that the pain would improved, but the pain worsened.  She had pain in the similar area 3-4 years ago that improved w PT w cupping  Current functional limitations include, but are not limited to standing, sitting, doing hair, lifting  Previous treatments: PT w dry needling  Recent accidents or falls: no  Marjan describes prior level of function as able to perform all desired ADL's. Pt goals included in as physical therapy goals below.      ASSESSMENT   Pt presents with subjective complaints and functional limitations as noted above.  Pt's function and objective finding are consistent with physician's diagnosis. Pt presents with the following limitations: decreased L median nerve neural tension; decreased cervical ROM; decreased B shldr ROM w pain for R abd and L extn; decreased B shldr abd strength w pain.     Pt and PT discussed evaluation findings, pathology, POC and HEP.  Pt voiced understanding and performs HEP correctly without reported pain. Skilled Physical Therapy is medically necessary to address the above impairments and reach functional goals.    In agreement with functional score and clinical rationale, this evaluation involved Moderate Complexity decision making due to 1-2 personal factors/comorbidities, 3 body structures involved/activity limitations, and evolving symptoms including changing pain levels.         SUBJECTIVE:     Visit count  1/8     Date 10/24/2024     Cerv/ B shldrs & L scap      Pain Range 1-2 to  10/10     Pain Ave 71-/10     Pain Current -2/10       Better: gabapentin,  Sensation: not UE, R LE   Night: falls asleep - ok, Position - back, Bedtime - 10, Wakes - bathroom 1x Hours of sleep - 6-7,   GI: bleeding ulcer, dumping syndrome, h/o gastric bypass  OB-Gyn:  NVD2, c-sec 2  Incontinence: no  Stress: high   Work: surgical tech  Living environment: apartment w sister  Stairs: no, flight up to laundry  Unexplained wt loss: no  Blood thinners: no  Diabetes: no  Latex Allergy: no  Pacemaker: no  Diplopia:no  Dysarthria: no  Dizziness: no  Dropping things: no     OBJECTIVE:     Objective Initial Evaluation Data 10/24/2024:    Neck Disability Index Score  Score: 36 % (10/26/2024  9:00 PM)         Posture:  L shldr elevated, R trunk lean, mild increased thoracic kyphosis, B shldr protraction, flattened thoracic spine, L trunk shift    Dermatomes 10/24/2024       Lat neck (C4) R wnl   Lat neck (C4) L wnl   Ant deltoid (C5) R wnl   Ant deltoid (C5) L wnl   Dorsal prox thumb (C6) R wnl   Dorsal prox thumb (C6) L wnl   Dorsal prox 3 digit (C7) R wnl   Dorsal prox 3 digit (C7) L wnl   Dorsal 4-5 digits (C8) R wnl   Dorsal 4-5 digits (C8) L wnl   Med prox elbow (T1) R wnl   Med prox elbow (T1) L wnl       UE Neural Glides 10/24/2024   ULTT 1 R wnl   ULTT 1 L Min-mod   ULTT 2 R wnl   ULTT 2 L  wnl   ULTT 3 R wnl   ULTT 3 L wnl       Cervical ROM 10/24/2024   Fwd head 31   Flexion 60 nl 62   Extension 70 nl 61   R SB 45 nl 38   L SB 45 nl 20   R Rotation 80 nl 60   L Rotation 80 nl 63   *pain limiting     Shoulder ROM 10/24/2024   Flex R  180 nl 160   Flex L  180 nl 153   Extn R  50 nl 61   Extn L  50 nl 65*   Abd R  180 nl 177*   Abd L  180 nl 178   ER R  90 nl 115   ER L  90 nl 100   IR R  80 nl 68   IR L  80 nl 60   *pain limiting    MMT 5/5 10/24/2024   C5 shldr abd R 4+*   Shldr abd L 4*   C6 biceps R 5   Biceps L 5   C7 Triceps R 5   Triceps L 5   C8 EPL R 5   EPL L 5   T1 Interossei R 5   Interossei L 5        Imaging:   PROCEDURE: XR CERVICAL SPINE (4 OR 5 VIEWS) (CPT=72050)     COMPARISON: None available.     INDICATIONS: Left-sided cervical pain over the preceding 3 months. No known precipitating antecedent injury.     TECHNIQUE: Cervical spine radiographs (5 views)     FINDINGS:  ALIGNMENT: The cervical lordosis is slightly accentuated trace retrolisthesis of C3 on C4, C4 on C5, and C5 on C6.  ATLANTOAXIAL: The odontoid and atlantoaxial joints are unremarkable.    VERTEBRAL BODIES: There is no evidence of fracture or radiographically apparent osseous lesion. The vertebral body heights are preserved. Anterior osteophyte formation is most conspicuous at C5-C7.  DISC SPACES: Intervertebral disc space narrowing is most pronounced at the C5-C6 and C6-C7 levels.  PREVERTEBRAL SOFT TISSUES: Negative for swelling or radiopaque foreign body.    OBLIQUE VIEWS: There is suspected significant neuroforaminal stenosis at the C5-C6 level.  OTHER:   Dental amalgam is present. Visualized portions of the lung apices are grossly clear.                  Impression   CONCLUSION:  1. Degenerative changes of cervical spine, particularly at C5-C6 and C6-C7.     2. Suspected neural foraminal narrowing, particular at C5-C6. Consider follow-up MRI of the cervical spine for further assessment.     3. No radiographically visible acute osseous injury of the cervical spine.     elm-remote.        Dictated by (CST): Redd Goldstein MD on 10/22/2024 at 11:29 PM      Finalized by (CST): Redd Goldstein MD on 10/22/2024 at 11:31 PM        Treatment performed this date:    Therapeutic Exercise:  Visit #   1/8   Position Exercise HEP 10/24/2024   Supine Cerv rotation ball H X    Median nerve glide H X   Sitting Cervical rotation  x    Median nerve glide H X         H = HEP. Pt given copies of this exercise for home program.  X = Exercises done this date - pt verbalized understanding and demonstrated competence. All exercises done B unless otherwise  indicated.  Pt advised to discontinue exercises that increase pain and to call or return to therapist to discuss. Each intervention above is specifically prescribed to address the patients identified impairments, activity limitations, and participation restrictions.    ASSESSMENT     Physical Therapy Goals:  From 10/24/2024 to 1/22/2025  - Created w patient input during initial assessment  1. Pt will be independent in beginning level of HEP for stretching, posture and strengthening.   2. Pt will be able to stand for 2 hours for work without increased symptoms.  3. Pt will be able to sit for 1 hour to eat a meal in a restaurant without increased symptoms.  4. Pt will be able to do her hair without increased symptoms.  5. Pt will be able to lift 25# without increased symptoms.     PLAN OF CARE:      Frequency/Duration: Patient will be seen for 1-2x/week or a total of 18 visits over a 90 day period. Treatment will include: Manual Therapy; Therapeutic Exercises; Neuromuscular Re-education; Therapeutic Activity; Ultrasound; Electrical Stim; Traction; Patient education; Home exercise program instruction;     Education or treatment limitation: None  Rehab Potential: good    Patient was advised of these findings, precautions, goals of treatment, plan of care, beginning self care and treatment options and has agreed to actively participate in planning and for this course of care.    Thank you for your referral. Please co-sign or sign and return this letter via fax as soon as possible to [phone]. If you have any question please contact me at Dept: [phone].    Sincerely,  Electronically signed by therapist: Melanie Muller PT    Physician’s certification required: Yes  I certify the need for these services furnished under this plan of treatment and while under my care.    X______________________________________________ Date________________  Certification From: 10/24/2024      To: 1/22/2025          Charges: SHAD Lama 2  TE 1 - 12 min    Total Timed treatment: 12 min      Total Treatment Time: 45 min    _____________________________________________________________________________________________    21st Century Cures Act Notice to Patient: Medical documents like this are made available to patients in the interest of transparency. However, be advised this is a medical document and it is intended as qdie-bo-dcib communication between your medical providers. This medical document may contain abbreviations, assessments, medical data, and results or other terms that are unfamiliar. Medical documents are intended to carry relevant information, facts as evident, and the clinical opinion of the practitioner. As such, this medical document may be written in language that appears blunt or direct. You are encouraged to contact your medical provider and/or [facility] Patient Experience if you have any questions about this medical document.

## 2024-10-28 ENCOUNTER — OFFICE VISIT (OUTPATIENT)
Dept: PHYSICAL THERAPY | Facility: HOSPITAL | Age: 54
End: 2024-10-28
Attending: PHYSICAL MEDICINE & REHABILITATION
Payer: OTHER MISCELLANEOUS

## 2024-10-28 PROCEDURE — 97530 THERAPEUTIC ACTIVITIES: CPT

## 2024-10-28 PROCEDURE — 97140 MANUAL THERAPY 1/> REGIONS: CPT

## 2024-10-28 PROCEDURE — 97110 THERAPEUTIC EXERCISES: CPT

## 2024-10-28 NOTE — PROGRESS NOTES
Marjan Castro    10/3/1970  Diagnosis: Myofascial pain (M79.18)  Radiculitis of left cervical region (M54.12)  Referring Physician:  Nain Morrow MD visit: none  Initial Evaluation Date: 10/24/2024  Date of Surgery: None for this diagnosis  Insurance: Workers Comp  Authorized # of visits:  4 by 1/22/2025  Plan of care to: 1/22/2025    Precautions/Hx: anxiety, bleeding ulcer, depression, dumping syndrome, HTN, gastric banding and then bypass, HLD, sleep apnea, c-sec, cholecystectomy, breast reduction, R ovarian surg due to cyst, incisional hernia    SUBJECTIVE:     Pt reports that she was better after the last visit, but because she felt better she cleaned her bathroom including mopping.  She had a flare up of pain.     Visit count  1/8 2/8    Date 10/24/2024 10/28/2024     Cerv/ B shldrs & L scap      Pain Range 1-2 to 10/10 0 to 7-8/10     Pain Ave 7/10 4/10    Pain Current 1-2/10 4/10       OBJECTIVE:     Treatment performed this date:    Therapeutic Exercise:  Visit #   1/8 2/8   Position Exercise HEP 10/24/2024 10/28/2024    Supine Cerv rotation ball H X     Median nerve glide H X     Deep exhale    X 5x    Deep exhale ribs down self palp ribs H  X 5x2   Sitting Cervical rotation  x     Median nerve glide H X     Pelvic tilt H   X     P tilt exhale T flex H  X     Breath work for parasymp H  X     Abdom set chest wall control H  X    Ther Act Function   X further education on condition as noted below    Man tx Brachial chain - B upper, lower ribs and then upper & lower with end range hold    X    H = HEP. Pt given copies of this exercise for home program.  X = Exercises done this date - pt verbalized understanding and demonstrated competence. All exercises done B unless otherwise indicated.  Pt advised to discontinue exercises that increase pain and to call or return to therapist to discuss. Each intervention above is specifically prescribed to address the patients identified impairments, activity  limitations, and participation restrictions.    ASSESSMENT     Pt educated on the importance of pacing activities to avoid overdoing w boom and bust to allow continued progression of exercise and functional activities.  Pt verbalized understanding.   Pt demonstrates chest wall flare positioning w decreased costal mobility.  Pt tolerated brachial chain mobilization well to upper and lower ribs w education on full mobility with breath work. Pt doing well at beginning level of lumbopelvic control to aid neutral spine for cervical positioning.  Discussed importance of thoracic mobility and neutral.     Physical Therapy Goals:  From 10/24/2024 to 1/22/2025  - Created w patient input during initial assessment  1. Pt will be independent in beginning level of HEP for stretching, posture and strengthening.   2. Pt will be able to stand for 2 hours for work without increased symptoms.  3. Pt will be able to sit for 1 hour to eat a meal in a restaurant without increased symptoms.  4. Pt will be able to do her hair without increased symptoms.  5. Pt will be able to lift 25# without increased symptoms.     PLAN OF CARE:      Continue PT per original plan for therapeutic exercises, posture retraining, therapeutic activities, manual treatment, neuromuscular reeducation, therapeutic pain neuroscience education, patient education, self care home management, home exercise program, and modalities as needed.    Charged Units Units Minutes   Ther Ex 2 23   Neuro     Ther Activity 1 10   Gait     Man Tx 1 12        Total Timed  45   Total Tx Time  45         _____________________________________________________________________________________________    21st Century Cures Act Notice to Patient: Medical documents like this are made available to patients in the interest of transparency. However, be advised this is a medical document and it is intended as [id number]-hl-cptf communication between your medical providers. This medical document may  contain abbreviations, assessments, medical data, and results or other terms that are unfamiliar. Medical documents are intended to carry relevant information, facts as evident, and the clinical opinion of the practitioner. As such, this medical document may be written in language that appears blunt or direct. You are encouraged to contact your medical provider and/or [facility] Patient Experience if you have any questions about this medical document.   Objective Initial Evaluation Data 10/24/2024:    Neck Disability Index Score  Score: 36 % (10/26/2024  9:00 PM)         Posture:  L shldr elevated, R trunk lean, mild increased thoracic kyphosis, B shldr protraction, flattened thoracic spine, L trunk shift    Dermatomes 10/24/2024       Lat neck (C4) R wnl   Lat neck (C4) L wnl   Ant deltoid (C5) R wnl   Ant deltoid (C5) L wnl   Dorsal prox thumb (C6) R wnl   Dorsal prox thumb (C6) L wnl   Dorsal prox 3 digit (C7) R wnl   Dorsal prox 3 digit (C7) L wnl   Dorsal 4-5 digits (C8) R wnl   Dorsal 4-5 digits (C8) L wnl   Med prox elbow (T1) R wnl   Med prox elbow (T1) L wnl       UE Neural Glides 10/24/2024   ULTT 1 R wnl   ULTT 1 L Min-mod   ULTT 2 R wnl   ULTT 2 L  wnl   ULTT 3 R wnl   ULTT 3 L wnl       Cervical ROM 10/24/2024   Fwd head 31   Flexion 60 nl 62   Extension 70 nl 61   R SB 45 nl 38   L SB 45 nl 20   R Rotation 80 nl 60   L Rotation 80 nl 63   *pain limiting     Shoulder ROM 10/24/2024   Flex R  180 nl 160   Flex L  180 nl 153   Extn R  50 nl 61   Extn L  50 nl 65*   Abd R  180 nl 177*   Abd L  180 nl 178   ER R  90 nl 115   ER L  90 nl 100   IR R  80 nl 68   IR L  80 nl 60   *pain limiting    MMT 5/5 10/24/2024   C5 shldr abd R 4+*   Shldr abd L 4*   C6 biceps R 5   Biceps L 5   C7 Triceps R 5   Triceps L 5   C8 EPL R 5   EPL L 5   T1 Interossei R 5   Interossei L 5       Chest wall mobility 10/28/2024    In mod-max chest wall flare positioning   Upper chest R Min-mod   Upper chest L Min-mod    Lower chest R Min-mod   Lower chest L Min-md         Imaging:   PROCEDURE: XR CERVICAL SPINE (4 OR 5 VIEWS) (CPT=72050)     COMPARISON: None available.     INDICATIONS: Left-sided cervical pain over the preceding 3 months. No known precipitating antecedent injury.     TECHNIQUE: Cervical spine radiographs (5 views)     FINDINGS:  ALIGNMENT: The cervical lordosis is slightly accentuated trace retrolisthesis of C3 on C4, C4 on C5, and C5 on C6.  ATLANTOAXIAL: The odontoid and atlantoaxial joints are unremarkable.    VERTEBRAL BODIES: There is no evidence of fracture or radiographically apparent osseous lesion. The vertebral body heights are preserved. Anterior osteophyte formation is most conspicuous at C5-C7.  DISC SPACES: Intervertebral disc space narrowing is most pronounced at the C5-C6 and C6-C7 levels.  PREVERTEBRAL SOFT TISSUES: Negative for swelling or radiopaque foreign body.    OBLIQUE VIEWS: There is suspected significant neuroforaminal stenosis at the C5-C6 level.  OTHER:   Dental amalgam is present. Visualized portions of the lung apices are grossly clear.                  Impression   CONCLUSION:  1. Degenerative changes of cervical spine, particularly at C5-C6 and C6-C7.     2. Suspected neural foraminal narrowing, particular at C5-C6. Consider follow-up MRI of the cervical spine for further assessment.     3. No radiographically visible acute osseous injury of the cervical spine.     elm-remote.        Dictated by (CST): Redd Goldstein MD on 10/22/2024 at 11:29 PM      Finalized by (CST): Redd Goldstein MD on 10/22/2024 at 11:31 PM

## 2024-10-29 ENCOUNTER — PATIENT MESSAGE (OUTPATIENT)
Dept: PHYSICAL MEDICINE AND REHAB | Facility: CLINIC | Age: 54
End: 2024-10-29

## 2024-11-02 ENCOUNTER — HOSPITAL ENCOUNTER (OUTPATIENT)
Dept: MAMMOGRAPHY | Facility: HOSPITAL | Age: 54
Discharge: HOME | End: 2024-11-02
Attending: PHYSICIAN ASSISTANT
Payer: COMMERCIAL

## 2024-11-02 DIAGNOSIS — Z12.31 ENCOUNTER FOR SCREENING MAMMOGRAM FOR BREAST CANCER: ICD-10-CM

## 2024-11-02 PROCEDURE — 77063 BREAST TOMOSYNTHESIS BI: CPT | Performed by: PHYSICIAN ASSISTANT

## 2024-11-02 PROCEDURE — 77067 SCR MAMMO BI INCL CAD: CPT | Performed by: PHYSICIAN ASSISTANT

## 2024-11-04 ENCOUNTER — OFFICE VISIT (OUTPATIENT)
Dept: PHYSICAL THERAPY | Facility: HOSPITAL | Age: 54
End: 2024-11-04
Attending: PHYSICAL MEDICINE & REHABILITATION
Payer: OTHER MISCELLANEOUS

## 2024-11-04 PROCEDURE — 97530 THERAPEUTIC ACTIVITIES: CPT

## 2024-11-04 PROCEDURE — 97140 MANUAL THERAPY 1/> REGIONS: CPT

## 2024-11-04 PROCEDURE — 97110 THERAPEUTIC EXERCISES: CPT

## 2024-11-04 NOTE — PROGRESS NOTES
Marjan Castro    10/3/1970  Diagnosis: Myofascial pain (M79.18)  Radiculitis of left cervical region (M54.12)  Referring Physician:  Nain Morrow MD visit: none  Initial Evaluation Date: 10/24/2024  Date of Surgery: None for this diagnosis  Insurance: Workers Comp  Authorized # of visits:  4 by 1/22/2025  Plan of care to: 1/22/2025    Precautions/Hx: anxiety, bleeding ulcer, depression, dumping syndrome, HTN, gastric banding and then bypass, HLD, sleep apnea, c-sec, cholecystectomy, breast reduction, R ovarian surg due to cyst, incisional hernia    SUBJECTIVE:     Pt reports that she has very good days and then very bad days.  She notes that she was in bed on Saturday w pain and Sunday she was ok.  She continues to have cramping in the muscle on the R side of the neck.  She feels that she has hard stop on her cervical ROM.  She states that she has been able to be consistent with the deep breathing, rib work and upper abdominal activation.       Visit count  1/8 2/8 3/8   Date 10/24/2024 10/28/2024  11/4/2024    Cerv/ B shldrs & L scap      Pain Range 1-2 to 10/10 0 to 7-8/10  0-9/10   Pain Ave 7/10 4/10 6-7/10   Pain Current 1-2/10 4/10 5/10      OBJECTIVE:     Treatment performed this date:    Therapeutic Exercise:  Visit #   1/8 2/8 3/8   Position Exercise HEP 10/24/2024 10/28/2024  11/4/2024    Supine Cerv rotation ball H X  X 10x2    Median nerve glide H X      Deep exhale    X 5x     Deep exhale ribs down self palp ribs H  X 5x2 X 5x2   Sidelying Trunk rotation book H   X 5x 7s   Sitting Cervical rotation  x  x    Median nerve glide H X  x    Pelvic tilt H   X  x    P tilt exhale T flex H  X      Breath work for parasymp H  X      Abdom set chest wall control H  X      Thoracic rotation chair H   X    Ther Act Function   X further education on condition as noted below     Man tx Brachial chain - B upper, lower ribs and then upper & lower with end range hold    X      Suboccipital release    X      Manual traction    X     Lower cervical oscillations    X     MET    X C0-1 ErotLSBR, C3 FRSR/L; C4-6 FRSL;   H = HEP. Pt given copies of this exercise for home program.  X = Exercises done this date - pt verbalized understanding and demonstrated competence. All exercises done B unless otherwise indicated.  Pt advised to discontinue exercises that increase pain and to call or return to therapist to discuss. Each intervention above is specifically prescribed to address the patients identified impairments, activity limitations, and participation restrictions.    ASSESSMENT     Pt continues to have significant pain limitations affecting function.  Pt demonstrates mod-max limitations in B suboccipital myofascia.  She did well with man release and noted some improved upper cervical mobility.  Pt demonstrates segmental mobility limitations as noted above and tolerated manual therapy with >50% improvement in mobility after treatment.  Pt advised that man tx goal is to allow improved and ongoing mobility and strength. Pt educated on facet joint anatomy and importance of balance function of interspinales to allow normal spinal mobility.  Pt verbalized understanding.  Pt encouraged to perform thoracic and cervical rotation self mobilization to maintain gains made.   Physical Therapy Goals:  From 10/24/2024 to 1/22/2025  - Created w patient input during initial assessment  1. Pt will be independent in beginning level of HEP for stretching, posture and strengthening.   2. Pt will be able to stand for 2 hours for work without increased symptoms.  3. Pt will be able to sit for 1 hour to eat a meal in a restaurant without increased symptoms.  4. Pt will be able to do her hair without increased symptoms.  5. Pt will be able to lift 25# without increased symptoms.     PLAN OF CARE:      Continue PT per original plan for therapeutic exercises, posture retraining, therapeutic activities, manual treatment, neuromuscular reeducation,  therapeutic pain neuroscience education, patient education, self care home management, home exercise program, and modalities as needed.    Charged Units Units Minutes    Ther Ex 1 10   Neuro     Ther Activity 1 10   Gait     Man Tx 2 25        Total Timed  45   Total Tx Time  45         _____________________________________________________________________________________________    21st Century Cures Act Notice to Patient: Medical documents like this are made available to patients in the interest of transparency. However, be advised this is a medical document and it is intended as pitn-pd-mvnn communication between your medical providers. This medical document may contain abbreviations, assessments, medical data, and results or other terms that are unfamiliar. Medical documents are intended to carry relevant information, facts as evident, and the clinical opinion of the practitioner. As such, this medical document may be written in language that appears blunt or direct. You are encouraged to contact your medical provider and/or [facility] Patient Experience if you have any questions about this medical document.   Objective Initial Evaluation Data 10/24/2024:    Neck Disability Index Score  Score: 36 % (10/26/2024  9:00 PM)         Posture:  L shldr elevated, R trunk lean, mild increased thoracic kyphosis, B shldr protraction, flattened thoracic spine, L trunk shift    Dermatomes 10/24/2024       Lat neck (C4) R wnl   Lat neck (C4) L wnl   Ant deltoid (C5) R wnl   Ant deltoid (C5) L wnl   Dorsal prox thumb (C6) R wnl   Dorsal prox thumb (C6) L wnl   Dorsal prox 3 digit (C7) R wnl   Dorsal prox 3 digit (C7) L wnl   Dorsal 4-5 digits (C8) R wnl   Dorsal 4-5 digits (C8) L wnl   Med prox elbow (T1) R wnl   Med prox elbow (T1) L wnl       UE Neural Glides 10/24/2024   ULTT 1 R wnl   ULTT 1 L Min-mod   ULTT 2 R wnl   ULTT 2 L  wnl   ULTT 3 R wnl   ULTT 3 L wnl       Cervical ROM 10/24/2024   Fwd head 31    Flexion 60 nl 62   Extension 70 nl 61   R SB 45 nl 38   L SB 45 nl 20   R Rotation 80 nl 60   L Rotation 80 nl 63   *pain limiting     Shoulder ROM 10/24/2024   Flex R  180 nl 160   Flex L  180 nl 153   Extn R  50 nl 61   Extn L  50 nl 65*   Abd R  180 nl 177*   Abd L  180 nl 178   ER R  90 nl 115   ER L  90 nl 100   IR R  80 nl 68   IR L  80 nl 60   *pain limiting    MMT 5/5 10/24/2024   C5 shldr abd R 4+*   Shldr abd L 4*   C6 biceps R 5   Biceps L 5   C7 Triceps R 5   Triceps L 5   C8 EPL R 5   EPL L 5   T1 Interossei R 5   Interossei L 5       Chest wall mobility 10/28/2024    In mod-max chest wall flare positioning   Upper chest R Min-mod   Upper chest L Min-mod   Lower chest R Min-mod   Lower chest L Min-md         Imaging:   PROCEDURE: XR CERVICAL SPINE (4 OR 5 VIEWS) (CPT=72050)     COMPARISON: None available.     INDICATIONS: Left-sided cervical pain over the preceding 3 months. No known precipitating antecedent injury.     TECHNIQUE: Cervical spine radiographs (5 views)     FINDINGS:  ALIGNMENT: The cervical lordosis is slightly accentuated trace retrolisthesis of C3 on C4, C4 on C5, and C5 on C6.  ATLANTOAXIAL: The odontoid and atlantoaxial joints are unremarkable.    VERTEBRAL BODIES: There is no evidence of fracture or radiographically apparent osseous lesion. The vertebral body heights are preserved. Anterior osteophyte formation is most conspicuous at C5-C7.  DISC SPACES: Intervertebral disc space narrowing is most pronounced at the C5-C6 and C6-C7 levels.  PREVERTEBRAL SOFT TISSUES: Negative for swelling or radiopaque foreign body.    OBLIQUE VIEWS: There is suspected significant neuroforaminal stenosis at the C5-C6 level.  OTHER:   Dental amalgam is present. Visualized portions of the lung apices are grossly clear.                  Impression   CONCLUSION:  1. Degenerative changes of cervical spine, particularly at C5-C6 and C6-C7.     2. Suspected neural foraminal narrowing, particular at C5-C6.  Consider follow-up MRI of the cervical spine for further assessment.     3. No radiographically visible acute osseous injury of the cervical spine.     elm-remote.        Dictated by (CST): Redd Goldstein MD on 10/22/2024 at 11:29 PM      Finalized by (CST): Redd Goldstein MD on 10/22/2024 at 11:31 PM

## 2024-11-06 ENCOUNTER — TELEPHONE (OUTPATIENT)
Dept: PHYSICAL MEDICINE AND REHAB | Facility: CLINIC | Age: 54
End: 2024-11-06

## 2024-11-06 NOTE — TELEPHONE ENCOUNTER
Office visit notes, order work note from Oct 22nd were faxed out to [employer] adjustor    Adjustor: Shavon Graff  Fax# [phone]

## 2024-11-12 ENCOUNTER — PATIENT MESSAGE (OUTPATIENT)
Dept: CASE MANAGEMENT | Age: 54
End: 2024-11-12

## 2024-11-12 ENCOUNTER — OFFICE VISIT (OUTPATIENT)
Dept: PHYSICAL THERAPY | Facility: HOSPITAL | Age: 54
End: 2024-11-12
Payer: OTHER MISCELLANEOUS

## 2024-11-12 ENCOUNTER — APPOINTMENT (OUTPATIENT)
Dept: PHYSICAL THERAPY | Facility: HOSPITAL | Age: 54
End: 2024-11-12
Attending: PHYSICAL MEDICINE & REHABILITATION
Payer: OTHER MISCELLANEOUS

## 2024-11-12 PROCEDURE — 97530 THERAPEUTIC ACTIVITIES: CPT

## 2024-11-12 PROCEDURE — 97140 MANUAL THERAPY 1/> REGIONS: CPT

## 2024-11-12 PROCEDURE — 97110 THERAPEUTIC EXERCISES: CPT

## 2024-11-12 NOTE — PROGRESS NOTES
Marjan Castro    10/3/1970  Diagnosis: Myofascial pain (M79.18)  Radiculitis of left cervical region (M54.12)  Referring Physician:  Nain Morrow MD visit: none  Initial Evaluation Date: 10/24/2024  Date of Surgery: None for this diagnosis  Insurance: Workers Comp  Authorized # of visits:  4 by 1/22/2025  Plan of care to: 1/22/2025    Precautions/Hx: anxiety, bleeding ulcer, depression, dumping syndrome, HTN, gastric banding and then bypass, HLD, sleep apnea, c-sec, cholecystectomy, breast reduction, R ovarian surg due to cyst, incisional hernia    SUBJECTIVE:     Pt reports that she continues to have large flare ups of pain.  She had sharp L parascapular pain today.  Pt notes that she has pain every day.  She is pushing herself during the week and then will collapse in pain in bed on the weekend.  She took Gabapentin today to manage the pain.  She is now scheduled for an injection.      Visit count  1/8 2/8 3/8 4/8   Date 10/24/2024 10/28/2024  11/4/2024  11/12/2024    Cerv/ B shldrs & L scap       Pain Range 1-2 to 10/10 0 to 7-8/10  0-9/10 0-9/10   Pain Ave 7/10 4/10 6-7/10 6-7/10   Pain Current 1-2/10 4/10 5/10 4/10      OBJECTIVE:     Treatment performed this date:    Therapeutic Exercise:  Visit #   2/8 3/8 4/8   Position Exercise HEP 10/28/2024  11/4/2024  11/12/2024    Supine Cerv rotation ball H  X 10x2 10x2    Median nerve glide H       Deep exhale   X 5x      Deep exhale ribs down self palp ribs H X 5x2 X 5x2 X 5x2    Thoracic extn over towel roll H   X    Sidelying Trunk rotation book H  X 5x 7s 5x   Sitting Cervical rotation   x 3x    Median nerve glide H  x 10x    Pelvic tilt H  X  x     P tilt exhale T flex H X       Breath work for parasymp H X       Abdom set chest wall control H X       Thoracic rotation chair H  X  X 3x   Ther Act Function  X further education on condition as noted below   X further education on condition as noted below    Man tx Brachial chain - B upper, lower  ribs and then upper & lower with end range hold   X   X     Suboccipital release   X      Manual traction   X      Lower cervical oscillations   X      MET   X C0-1 ErotLSBR, C3 FRSR/L; C4-6 FRSL;     STM    X B UT mod-max myofascial restrictions, L upper IO/EO     Joint mobs    X thoracic spine grade III, rotation mobs to areas of restriction   H = HEP. Pt given copies of this exercise for home program.  X = Exercises done this date - pt verbalized understanding and demonstrated competence. All exercises done B unless otherwise indicated.  Pt advised to discontinue exercises that increase pain and to call or return to therapist to discuss. Each intervention above is specifically prescribed to address the patients identified impairments, activity limitations, and participation restrictions.    ASSESSMENT     Pt continues to have large flare ups of global body pain.  Pt notes that she has been reading and watching more about fibromyalgia.  Pt educated on the management of autonomic nervous system balance of sympathetic and parasympathetic branches.  Discussed use of meditation, prayer, and deep breathing to aid frequent re-balancing to parasympathetic system to allow for rest, digest and heal.  Pt verbalized understanding.   Pt assessed for thoracic spinal mobility and demonstrates several areas of significant restriction.  Pt tolerated mob as noted above and instructed to continue to self mobilize. Pt demonstrated guarding tension pattern in L upper abdominal wall and did well w myofascial release.   Physical Therapy Goals:  From 10/24/2024 to 1/22/2025  - Created w patient input during initial assessment  1. Pt will be independent in beginning level of HEP for stretching, posture and strengthening.   2. Pt will be able to stand for 2 hours for work without increased symptoms.  3. Pt will be able to sit for 1 hour to eat a meal in a restaurant without increased symptoms.  4. Pt will be able to do her hair without  increased symptoms.  5. Pt will be able to lift 25# without increased symptoms.     PLAN OF CARE:      Assess response to spinal joint mobilization.  Continue PT per original plan for therapeutic exercises, posture retraining, therapeutic activities, manual treatment, neuromuscular reeducation, therapeutic pain neuroscience education, patient education, self care home management, home exercise program, and modalities as needed.    Charged Units Units Minutes    Ther Ex 1 10   Neuro     Ther Activity 1 10   Gait     Man Tx 2 25        Total Timed  45   Total Tx Time  45         _____________________________________________________________________________________________    21st Century Cures Act Notice to Patient: Medical documents like this are made available to patients in the interest of transparency. However, be advised this is a medical document and it is intended as qntn-ax-fael communication between your medical providers. This medical document may contain abbreviations, assessments, medical data, and results or other terms that are unfamiliar. Medical documents are intended to carry relevant information, facts as evident, and the clinical opinion of the practitioner. As such, this medical document may be written in language that appears blunt or direct. You are encouraged to contact your medical provider and/or [facility] Patient Experience if you have any questions about this medical document.   Objective Initial Evaluation Data 10/24/2024:    Neck Disability Index Score  Score: 36 % (10/26/2024  9:00 PM)         Posture:  L shldr elevated, R trunk lean, mild increased thoracic kyphosis, B shldr protraction, flattened thoracic spine, L trunk shift    Dermatomes 10/24/2024       Lat neck (C4) R wnl   Lat neck (C4) L wnl   Ant deltoid (C5) R wnl   Ant deltoid (C5) L wnl   Dorsal prox thumb (C6) R wnl   Dorsal prox thumb (C6) L wnl   Dorsal prox 3 digit (C7) R wnl   Dorsal prox 3 digit (C7) L wnl    Dorsal 4-5 digits (C8) R wnl   Dorsal 4-5 digits (C8) L wnl   Med prox elbow (T1) R wnl   Med prox elbow (T1) L wnl       UE Neural Glides 10/24/2024   ULTT 1 R wnl   ULTT 1 L Min-mod   ULTT 2 R wnl   ULTT 2 L  wnl   ULTT 3 R wnl   ULTT 3 L wnl       Cervical ROM 10/24/2024   Fwd head 31   Flexion 60 nl 62   Extension 70 nl 61   R SB 45 nl 38   L SB 45 nl 20   R Rotation 80 nl 60   L Rotation 80 nl 63   *pain limiting     Shoulder ROM 10/24/2024   Flex R  180 nl 160   Flex L  180 nl 153   Extn R  50 nl 61   Extn L  50 nl 65*   Abd R  180 nl 177*   Abd L  180 nl 178   ER R  90 nl 115   ER L  90 nl 100   IR R  80 nl 68   IR L  80 nl 60   *pain limiting    MMT 5/5 10/24/2024   C5 shldr abd R 4+*   Shldr abd L 4*   C6 biceps R 5   Biceps L 5   C7 Triceps R 5   Triceps L 5   C8 EPL R 5   EPL L 5   T1 Interossei R 5   Interossei L 5       Chest wall mobility 10/28/2024    In mod-max chest wall flare positioning   Upper chest R Min-mod   Upper chest L Min-mod   Lower chest R Min-mod   Lower chest L Min-md         Imaging:   PROCEDURE: XR CERVICAL SPINE (4 OR 5 VIEWS) (CPT=72050)     COMPARISON: None available.     INDICATIONS: Left-sided cervical pain over the preceding 3 months. No known precipitating antecedent injury.     TECHNIQUE: Cervical spine radiographs (5 views)     FINDINGS:  ALIGNMENT: The cervical lordosis is slightly accentuated trace retrolisthesis of C3 on C4, C4 on C5, and C5 on C6.  ATLANTOAXIAL: The odontoid and atlantoaxial joints are unremarkable.    VERTEBRAL BODIES: There is no evidence of fracture or radiographically apparent osseous lesion. The vertebral body heights are preserved. Anterior osteophyte formation is most conspicuous at C5-C7.  DISC SPACES: Intervertebral disc space narrowing is most pronounced at the C5-C6 and C6-C7 levels.  PREVERTEBRAL SOFT TISSUES: Negative for swelling or radiopaque foreign body.    OBLIQUE VIEWS: There is suspected significant neuroforaminal stenosis at the  C5-C6 level.  OTHER:   Dental amalgam is present. Visualized portions of the lung apices are grossly clear.                  Impression   CONCLUSION:  1. Degenerative changes of cervical spine, particularly at C5-C6 and C6-C7.     2. Suspected neural foraminal narrowing, particular at C5-C6. Consider follow-up MRI of the cervical spine for further assessment.     3. No radiographically visible acute osseous injury of the cervical spine.     elm-remote.        Dictated by (CST): Redd Goldstein MD on 10/22/2024 at 11:29 PM      Finalized by (CST): Redd Goldstein MD on 10/22/2024 at 11:31 PM

## 2024-11-13 ENCOUNTER — HOSPITAL ENCOUNTER (OUTPATIENT)
Dept: MRI IMAGING | Facility: HOSPITAL | Age: 54
Discharge: HOME | End: 2024-11-13
Attending: PHYSICAL MEDICINE & REHABILITATION
Payer: OTHER MISCELLANEOUS

## 2024-11-13 ENCOUNTER — PATIENT MESSAGE (OUTPATIENT)
Dept: PHYSICAL MEDICINE AND REHAB | Facility: CLINIC | Age: 54
End: 2024-11-13

## 2024-11-13 DIAGNOSIS — M79.18 MYOFASCIAL PAIN: ICD-10-CM

## 2024-11-13 DIAGNOSIS — M54.12 RADICULITIS OF LEFT CERVICAL REGION: ICD-10-CM

## 2024-11-13 DIAGNOSIS — M79.18 MYOFASCIAL PAIN: Primary | ICD-10-CM

## 2024-11-13 PROCEDURE — 72141 MRI NECK SPINE W/O DYE: CPT | Performed by: PHYSICAL MEDICINE & REHABILITATION

## 2024-11-13 RX ORDER — DIAZEPAM 5 MG/1
5 TABLET ORAL EVERY 6 HOURS PRN
Qty: 10 TABLET | Refills: 0 | Status: SHIPPED | OUTPATIENT
Start: 2024-11-13

## 2024-11-13 NOTE — TELEPHONE ENCOUNTER
Valium 5mg at bedtime or BID x3 days, then 5mg PRN pain. Prescription sent to pharmacy. Also have the MRI done. If she has a  she may take the valium to make sure she is able to lie still enough to complete the MRI.

## 2024-11-13 NOTE — TELEPHONE ENCOUNTER
Location of Pain: left upper back pain, old, intermittent. Doesn't radiate  Old or new pain: old    Numeric Rating Scale:  Pain at Present:  9                                                                                                            (No Pain) 0  to  10 (Worst Pain)                 Minimum Pain:   8  Maximum Pain  10    Distribution of Pain:    left  Quality of Pain:   sharp/stabbing, constant (no thing that aggravates it, is the same at all times)  No numbness or tingling.  Per patient some days pain is unbearable and at times more tolerable. Today is worst  Current pain treatment: Gabapentin, has Hydrocodone left other which she tried  (doesn't help) Has some Toradol which she tried even though she had gastric bypass & hisotry of stomach bleeding per patient and (did not help)   5 % lidocaine patches   LOV:10/22/2024 Nain Malcolm DO   NOV: 11/21/2024 Nain Malcolm DO     Summary of patient request: Recommendations from Dr Malcolm    MRI is tonight at 7:15 pm.    Patient was encouraged to go to ED if pain is unbearable, however patient stated she cannot go as they will only give her toradol which helps for 2 hours only.    Patient stated she cannot leave work as there is no one to cover for her.     Routing to Dr Malcolm.

## 2024-11-14 ENCOUNTER — TELEPHONE (OUTPATIENT)
Dept: PHYSICAL MEDICINE AND REHAB | Facility: CLINIC | Age: 54
End: 2024-11-14

## 2024-11-14 NOTE — TELEPHONE ENCOUNTER
Patient advised due to lack of coverage she was not even able to leave work to go to ER so she called in today and is asking if she can get a note to stay off work until after her TPI which is scheduled on 11/21/2024 and to return to work on the Monday following the TPI to give it a chance to take some effect.    Would Dr. Malcolm be ok with her staying out of work until her first TPI?    This RN has pended a letter and routed to Dr. Malcolm for review & signature-     To be sent via [employer].

## 2024-11-18 ENCOUNTER — TELEPHONE (OUTPATIENT)
Dept: PHYSICAL MEDICINE AND REHAB | Facility: CLINIC | Age: 54
End: 2024-11-18

## 2024-11-20 NOTE — TELEPHONE ENCOUNTER
Asked to verify directions for the Diazepam 5 mg tablet. Form faxed back to provided fax number [phone] with administration instructions as per written order.

## 2024-11-20 NOTE — TELEPHONE ENCOUNTER
Letter was signed by Dr. Malcolm on 11/14/2024 but never sent to the patient. Letter sent at this time - see PM encounter 11/13/2024 for further details if needed.

## 2024-11-20 NOTE — TELEPHONE ENCOUNTER
Asked to verify directions for the Diazepam 5 mg tablet. Form faxed back to provided fax number [phone] with administration instructions as per written order.

## 2024-11-21 ENCOUNTER — OFFICE VISIT (OUTPATIENT)
Dept: PHYSICAL MEDICINE AND REHAB | Facility: CLINIC | Age: 54
End: 2024-11-21
Payer: OTHER MISCELLANEOUS

## 2024-11-21 DIAGNOSIS — M79.18 MYOFASCIAL PAIN: Primary | ICD-10-CM

## 2024-11-21 DIAGNOSIS — M54.12 RADICULITIS OF LEFT CERVICAL REGION: ICD-10-CM

## 2024-11-21 PROCEDURE — 99213 OFFICE O/P EST LOW 20 MIN: CPT | Performed by: PHYSICAL MEDICINE & REHABILITATION

## 2024-11-21 PROCEDURE — 20552 NJX 1/MLT TRIGGER POINT 1/2: CPT | Performed by: PHYSICAL MEDICINE & REHABILITATION

## 2024-11-21 RX ORDER — TRIAMCINOLONE ACETONIDE 40 MG/ML
20 INJECTION, SUSPENSION INTRA-ARTICULAR; INTRAMUSCULAR ONCE
Status: COMPLETED | OUTPATIENT
Start: 2024-11-21 | End: 2024-11-21

## 2024-11-21 RX ORDER — LIDOCAINE HYDROCHLORIDE 10 MG/ML
4.5 INJECTION, SOLUTION INFILTRATION; PERINEURAL ONCE
Status: COMPLETED | OUTPATIENT
Start: 2024-11-21 | End: 2024-11-21

## 2024-11-22 NOTE — PROGRESS NOTES
Patient here for TPI today. We reviewed the MRI of the cervical spine, which shows small eccentric disc bulges towards the left at C4-5 and C5-6, with resulting mild/moderate foraminal narrowing at both levels. I am not fully convinced that her symptoms are radicular in nature; we will plan on proceeding with TPI as recommended on previous visit. Done today.     She also has appointments with a physical therapist at [facility] to incorporate cupping and deep tissue soft tissue techniques as part of her PT regimen; I gave her an external PT order to pursue that. In the meantime she should continue her HEP, continue light duty work, and follow up in 2-4 weeks.     15 minutes spent reviewing images with patient, discussing treatment options, completing documentation independent of time taken to perform TPI.     Nain Malcolm DO  Physical Medicine and Rehabilitation  [facility]

## 2024-11-22 NOTE — PROCEDURES
Procedure note: Trigger point injections  Procedure Diagnosis: Myofascial pain    Summary of Procedure:   Risks and benefits of the procedure were discussed with the patient and consent was obtained. Trigger points were palpated and marked along the left cervical paraspinals and upper trapezius for a total of 4 trigger points. Using betadine swabs, injection sites were cleaned in sterile fashion. Using a 27 gauge 1 1/2\" needle a total 20mg Kenolog diluted into 4.5cc of 1% Lidocaine was injected into the trigger points with 1-1.5 ml into each trigger point. There was negative aspiration of heme and no paresthesias were elicited. Patient tolerated the procedure well.

## 2024-11-26 ENCOUNTER — TELEPHONE (OUTPATIENT)
Dept: PHYSICAL MEDICINE AND REHAB | Facility: CLINIC | Age: 54
End: 2024-11-26

## 2024-12-03 ENCOUNTER — APPOINTMENT (OUTPATIENT)
Dept: PHYSICAL THERAPY | Facility: HOSPITAL | Age: 54
End: 2024-12-03
Attending: PHYSICAL MEDICINE & REHABILITATION
Payer: OTHER MISCELLANEOUS

## 2024-12-09 ENCOUNTER — TELEPHONE (OUTPATIENT)
Dept: PHYSICAL MEDICINE AND REHAB | Facility: CLINIC | Age: 54
End: 2024-12-09

## 2024-12-09 NOTE — TELEPHONE ENCOUNTER
[facility] for  Shavon Omalley to send authorization letter for follow up visit to our fax number.

## 2024-12-10 ENCOUNTER — OFFICE VISIT (OUTPATIENT)
Dept: PHYSICAL THERAPY | Facility: HOSPITAL | Age: 54
End: 2024-12-10
Attending: PHYSICAL MEDICINE & REHABILITATION
Payer: OTHER MISCELLANEOUS

## 2024-12-10 PROCEDURE — 97530 THERAPEUTIC ACTIVITIES: CPT

## 2024-12-10 PROCEDURE — 97112 NEUROMUSCULAR REEDUCATION: CPT

## 2024-12-10 PROCEDURE — 97110 THERAPEUTIC EXERCISES: CPT

## 2024-12-10 NOTE — PROGRESS NOTES
Marjan Castro    10/3/1970  Diagnosis: Myofascial pain (M79.18)  Radiculitis of left cervical region (M54.12)  Referring Physician:  Nain Morrow MD visit: none  Initial Evaluation Date: 10/24/2024  Date of Surgery: None for this diagnosis  Insurance: Workers Comp  Authorized # of visits:  4 by 1/22/2025  Plan of care to: 1/22/2025    Precautions/Hx: anxiety, bleeding ulcer, depression, dumping syndrome, HTN, gastric banding and then bypass, HLD, sleep apnea, c-sec, cholecystectomy, breast reduction, R ovarian surg due to cyst, incisional hernia    SUBJECTIVE:     Pt reports that she continues to have multiple areas of significant pain.  Pt states that she has had some relief w cupping tx in the past w another PT so wants to discontinue care after next session and only receive care w her other PT at [facility] in [place].  She had 4 trigger point injections in the L upper shoulder neck w min relief. Pt discussed that she is very frustrated with healthcare issues both on the receiving end and as a provider.     Visit count  1/8 2/8 3/8 4/8 5/8   Date 10/24/2024 10/28/2024  11/4/2024  11/12/2024  12/10/2024    Cerv/ B shldrs & L scap        Pain Range 1-2 to 10/10 0 to 7-8/10  0-9/10 0-9/10 0-9/10   Pain Ave 7/10 4/10 6-7/10 6-7/10 6/10   Pain Current 1-2/10 4/10 5/10 4/10 6/10        OBJECTIVE:     Treatment performed this date:    Therapeutic Exercise:  Visit #   3/8 4/8 5/8   Position Exercise HEP 11/4/2024  11/12/2024  12/10/2024    Supine Cerv rotation ball H X 10x2 10x2 10x2    Median nerve glide H       Deep exhale         Deep exhale ribs down self palp ribs H X 5x2 X 5x2 5x2    Thoracic extn over towel roll H  X      Curl up reach H   X     CAROLINE T8 extn arms 120 upper abd activation H   X    Sidelying Trunk rotation book H X 5x 7s 5x    Sitting Cervical rotation  x 3x     Median nerve glide H x 10x     Pelvic tilt H  x      P tilt exhale T flex H   x    Breath work for parasymp H   x    Abdom set  chest wall control H   x    Thoracic rotation chair H X  X 3x    Many ROM and strength for assessment    X    Neuro Re-ed    X cues for chest wall positioning awareness   Ther Act Function   X further education on condition as noted below  X further education on condition as noted below    Man tx Brachial chain - B upper, lower ribs and then upper & lower with end range hold    X      Suboccipital release  X       Manual traction  X       Lower cervical oscillations  X       MET  X C0-1 ErotLSBR, C3 FRSR/L; C4-6 FRSL;      STM   X B UT mod-max myofascial restrictions, L upper IO/EO      Joint mobs   X thoracic spine grade III, rotation mobs to areas of restriction    H = HEP. Pt given copies of this exercise for home program.  X = Exercises done this date - pt verbalized understanding and demonstrated competence. All exercises done B unless otherwise indicated.  Pt advised to discontinue exercises that increase pain and to call or return to therapist to discuss. Each intervention above is specifically prescribed to address the patients identified impairments, activity limitations, and participation restrictions.    ASSESSMENT     Pt continues to express high level of stress with pain, work and family issues that include some estrangement.  Discussed therapeutic neuroscience concepts further including the effects of stress on pain experience and pain as decision about danger in the brain which can be triggered w relational issues  Pt educated on the management of autonomic nervous system balance of sympathetic and parasympathetic branches.  Discussed use of meditation, prayer, and deep breathing to aid frequent re-balancing to parasympathetic system to allow for rest, digest and heal.  Pt encouraged to use a variety of parasympathetic activation techniques including deep breathing w slow exhale, chest butterfly tapping and humming.  Pt advised to incorporate progressive walking program out side weather permitting to  also aid.       Physical Therapy Goals:  From 10/24/2024 to 1/22/2025  - Created w patient input during initial assessment  1. Pt will be independent in beginning level of HEP for stretching, posture and strengthening. MET  2. Pt will be able to stand for 2 hours for work without increased symptoms. Progressing, can stand for 2 hours, but has ongoing pain  3. Pt will be able to sit for 1 hour to eat a meal in a restaurant without increased symptoms. MET  4. Pt will be able to do her hair without increased symptoms. MET for brush and wash, but not for coloring  5. Pt will be able to lift 25# without increased symptoms. Progressing, haven't tried     PLAN OF CARE:      Continue PT per original plan for therapeutic exercises, posture retraining, therapeutic activities, manual treatment, neuromuscular reeducation, therapeutic pain neuroscience education, patient education, self care home management, home exercise program, and modalities as needed.     Charged Units Units Minutes    Ther Ex 1 14   Neuro 1 8   Ther Activity 2 23   Gait     Man Tx          Total Timed  45   Total Tx Time  45         _____________________________________________________________________________________________    21st Century Cures Act Notice to Patient: Medical documents like this are made available to patients in the interest of transparency. However, be advised this is a medical document and it is intended as [id number] communication between your medical providers. This medical document may contain abbreviations, assessments, medical data, and results or other terms that are unfamiliar. Medical documents are intended to carry relevant information, facts as evident, and the clinical opinion of the practitioner. As such, this medical document may be written in language that appears blunt or direct. You are encouraged to contact your medical provider and/or [facility] Patient Experience if you have any questions about this  medical document.   Objective Initial Evaluation Data 10/24/2024:    Neck Disability Index Score  Score: 36 % (10/26/2024  9:00 PM)         Posture:  L shldr elevated, R trunk lean, mild increased thoracic kyphosis, B shldr protraction, flattened thoracic spine, L trunk shift    Dermatomes 10/24/2024       Lat neck (C4) R wnl   Lat neck (C4) L wnl   Ant deltoid (C5) R wnl   Ant deltoid (C5) L wnl   Dorsal prox thumb (C6) R wnl   Dorsal prox thumb (C6) L wnl   Dorsal prox 3 digit (C7) R wnl   Dorsal prox 3 digit (C7) L wnl   Dorsal 4-5 digits (C8) R wnl   Dorsal 4-5 digits (C8) L wnl   Med prox elbow (T1) R wnl   Med prox elbow (T1) L wnl       UE Neural Glides 10/24/2024 12/10/2024    ULTT 1 R wnl wnl   ULTT 1 L Min-mod Min(-)   ULTT 2 R wnl wnl   ULTT 2 L  wnl wnl   ULTT 3 R wnl wnl   ULTT 3 L wnl wnl       Cervical ROM 10/24/2024 12/10/2024   Fwd head 31 24   Flexion 60 nl 62 65   Extension 70 nl 61 62   R SB 45 nl 38 40   L SB 45 nl 20 30   R Rotation 80 nl 60 60   L Rotation 80 nl 63 61   *pain limiting     Shoulder ROM 10/24/2024 12/10/2024   Flex R  180 nl 160 160   Flex L  180 nl 153 157   Extn R  50 nl 61 60   Extn L  50 nl 65* 60   Abd R  180 nl 177* 180   Abd L  180 nl 178 180   ER R  90 nl 115 91   ER L  90 nl 100 92   IR R  80 nl 68 75   IR L  80 nl 60 78   *pain limiting    MMT 5/5 10/24/2024 12/10/2024   C5 shldr abd R 4+* 5-*   Shldr abd L 4* 5   C6 biceps R 5 5   Biceps L 5 5   C7 Triceps R 5 5   Triceps L 5 5   C8 EPL R 5 5   EPL L 5 5   T1 Interossei R 5 5   Interossei L 5 5       Chest wall mobility 10/28/2024    In mod-max chest wall flare positioning   Upper chest R Min-mod   Upper chest L Min-mod   Lower chest R Min-mod   Lower chest L Min-md         Imaging:   PROCEDURE: XR CERVICAL SPINE (4 OR 5 VIEWS) (CPT=72050)     COMPARISON: None available.     INDICATIONS: Left-sided cervical pain over the preceding 3 months. No known precipitating antecedent injury.     TECHNIQUE: Cervical spine  radiographs (5 views)     FINDINGS:  ALIGNMENT: The cervical lordosis is slightly accentuated trace retrolisthesis of C3 on C4, C4 on C5, and C5 on C6.  ATLANTOAXIAL: The odontoid and atlantoaxial joints are unremarkable.    VERTEBRAL BODIES: There is no evidence of fracture or radiographically apparent osseous lesion. The vertebral body heights are preserved. Anterior osteophyte formation is most conspicuous at C5-C7.  DISC SPACES: Intervertebral disc space narrowing is most pronounced at the C5-C6 and C6-C7 levels.  PREVERTEBRAL SOFT TISSUES: Negative for swelling or radiopaque foreign body.    OBLIQUE VIEWS: There is suspected significant neuroforaminal stenosis at the C5-C6 level.  OTHER:   Dental amalgam is present. Visualized portions of the lung apices are grossly clear.                  Impression   CONCLUSION:  1. Degenerative changes of cervical spine, particularly at C5-C6 and C6-C7.     2. Suspected neural foraminal narrowing, particular at C5-C6. Consider follow-up MRI of the cervical spine for further assessment.     3. No radiographically visible acute osseous injury of the cervical spine.     elm-remote.        Dictated by (CST): Redd Goldstein MD on 10/22/2024 at 11:29 PM      Finalized by (CST): Redd Goldstein MD on 10/22/2024 at 11:31 PM

## 2024-12-16 ENCOUNTER — MED REC SCAN ONLY (OUTPATIENT)
Dept: PHYSICAL MEDICINE AND REHAB | Facility: CLINIC | Age: 54
End: 2024-12-16

## 2024-12-17 ENCOUNTER — APPOINTMENT (OUTPATIENT)
Dept: PHYSICAL THERAPY | Facility: HOSPITAL | Age: 54
End: 2024-12-17
Attending: PHYSICAL MEDICINE & REHABILITATION
Payer: OTHER MISCELLANEOUS

## 2024-12-17 NOTE — PROGRESS NOTES
Pt reached out to cancel all further PT appts.  She is seeking care closer to her home.  Pt discharged from PT.  Please see last progress note for pt status.

## 2024-12-18 ENCOUNTER — PATIENT MESSAGE (OUTPATIENT)
Dept: FAMILY MEDICINE CLINIC | Facility: CLINIC | Age: 54
End: 2024-12-18

## 2024-12-19 NOTE — TELEPHONE ENCOUNTER
Called and left message for patient to call back. Please transfer [facility] ext.   Unsure who prescribes Viozah for the patient?

## 2024-12-19 NOTE — TELEPHONE ENCOUNTER
Dorina, please review patient message below regarding to her Veozah and changes to her insurance coverage. Thank you.

## 2024-12-20 NOTE — TELEPHONE ENCOUNTER
Per medication list Dr. Saturnino [facility] refilled this last. Will have patient direct question to Dr. Moore

## 2024-12-24 ENCOUNTER — APPOINTMENT (OUTPATIENT)
Dept: PHYSICAL THERAPY | Facility: HOSPITAL | Age: 54
End: 2024-12-24
Attending: PHYSICAL MEDICINE & REHABILITATION
Payer: OTHER MISCELLANEOUS

## 2024-12-28 ENCOUNTER — OFFICE VISIT (OUTPATIENT)
Dept: FAMILY MEDICINE CLINIC | Facility: CLINIC | Age: 54
End: 2024-12-28

## 2024-12-28 VITALS
HEART RATE: 105 BPM | HEIGHT: 59 IN | SYSTOLIC BLOOD PRESSURE: 119 MMHG | DIASTOLIC BLOOD PRESSURE: 84 MMHG | WEIGHT: 139 LBS | BODY MASS INDEX: 28.02 KG/M2 | TEMPERATURE: 98 F

## 2024-12-28 DIAGNOSIS — J02.0 STREP PHARYNGITIS: Primary | ICD-10-CM

## 2024-12-28 DIAGNOSIS — J39.9 UPPER RESPIRATORY DISEASE: ICD-10-CM

## 2024-12-28 DIAGNOSIS — J02.9 SORE THROAT: ICD-10-CM

## 2024-12-28 PROCEDURE — 87880 STREP A ASSAY W/OPTIC: CPT | Performed by: PHYSICIAN ASSISTANT

## 2024-12-28 PROCEDURE — 99213 OFFICE O/P EST LOW 20 MIN: CPT | Performed by: PHYSICIAN ASSISTANT

## 2024-12-28 PROCEDURE — 3074F SYST BP LT 130 MM HG: CPT | Performed by: PHYSICIAN ASSISTANT

## 2024-12-28 PROCEDURE — 3008F BODY MASS INDEX DOCD: CPT | Performed by: PHYSICIAN ASSISTANT

## 2024-12-28 PROCEDURE — 3079F DIAST BP 80-89 MM HG: CPT | Performed by: PHYSICIAN ASSISTANT

## 2024-12-28 RX ORDER — OSELTAMIVIR PHOSPHATE 75 MG/1
75 CAPSULE ORAL 2 TIMES DAILY
Qty: 10 CAPSULE | Refills: 0 | Status: SHIPPED | OUTPATIENT
Start: 2024-12-28 | End: 2025-01-02

## 2024-12-28 RX ORDER — CLINDAMYCIN HYDROCHLORIDE 300 MG/1
300 CAPSULE ORAL 2 TIMES DAILY
COMMUNITY
Start: 2024-12-02 | End: 2024-12-28

## 2024-12-28 RX ORDER — METRONIDAZOLE 500 MG/1
500 TABLET ORAL 2 TIMES DAILY
COMMUNITY
Start: 2024-10-15 | End: 2024-12-28

## 2024-12-28 RX ORDER — AMOXICILLIN 500 MG/1
500 CAPSULE ORAL 2 TIMES DAILY
Qty: 20 CAPSULE | Refills: 0 | Status: SHIPPED | OUTPATIENT
Start: 2024-12-28 | End: 2025-01-07

## 2024-12-28 NOTE — PROGRESS NOTES
HPI:     Sore Throat   This is a new problem. Episode onset: 3 days. The maximum temperature recorded prior to her arrival was 100.4 - 100.9 F. The fever has been present for Less than 1 day. Associated symptoms include congestion, coughing and headaches. Pertinent negatives include no abdominal pain, diarrhea, ear discharge, ear pain, hoarse voice, neck pain, shortness of breath or vomiting.       Medications:     Current Outpatient Medications   Medication Sig Dispense Refill    amoxicillin 500 MG Oral Cap Take 1 capsule (500 mg total) by mouth 2 (two) times daily for 10 days. 20 capsule 0    oseltamivir 75 MG Oral Cap Take 1 capsule (75 mg total) by mouth 2 (two) times daily for 5 days. 10 capsule 0    sertraline 100 MG Oral Tab Take 2 tablets (200 mg total) by mouth every morning. 180 tablet 0    hydroCHLOROthiazide 25 MG Oral Tab Take 1 tablet (25 mg total) by mouth daily. 90 tablet 3    losartan 50 MG Oral Tab Take 1 tablet (50 mg total) by mouth daily. 90 tablet 3    metoprolol succinate ER 25 MG Oral Tablet 24 Hr Take 1 tablet (25 mg total) by mouth daily. 90 tablet 3    NON FORMULARY Take 1 tablet by mouth daily. Scryer brand of Zyrtec         Allergies:   Allergies[1]    History:     Health Maintenance   Topic Date Due    Annual Depression Screening  01/01/2024    Tobacco Cessation Counseling  Never done    COVID-19 Vaccine (3 - 2024-25 season) 09/01/2024    Influenza Vaccine (1) 10/01/2024    Annual Physical  08/22/2025    Mammogram  11/02/2025    Pap Smear  02/14/2027    DTaP,Tdap,and Td Vaccines (3 - Td or Tdap) 08/17/2030    Colorectal Cancer Screening  04/30/2032    Pneumococcal Vaccine: Birth to 64yrs  Completed    Zoster Vaccines  Completed       No LMP recorded. Patient is postmenopausal.   Past Medical History:     Past Medical History:    Anxiety    Bleeding ulcer    Depression    Dumping syndrome    Essential hypertension    H/O gastric bypass    for weight loss    H/O laparoscopic adjustable  gastric banding    removed     History of blood transfusion    Hyperlipidemia    Hypertension    Obesity (BMI 30-39.9)    KRISTY on CPAP    PONV (postoperative nausea and vomiting)    Screen for colon cancer    repeat CLN in     Sleep apnea    cpap       Past Surgical History:     Past Surgical History:   Procedure Laterality Date    Adjustment gastric band       delivery only      Cholecystectomy      Colonoscopy      Colonoscopy N/A 2022    Procedure: COLONOSCOPY;  Surgeon: ALVINA Goldstein MD;  Location: [facility] ENDOSCOPY    Gastric bypass,obesity,sb reconstruc      Hernia surgery      Lap gastric bypass/yue-en-y  2003    [place] surgical weight 225    Reduction left Left 1986    Reduction of large breast Bilateral     Reduction right Right 1986    Removal of ovary(s) Right        Family History:     Family History   Problem Relation Age of Onset    PTSD Mother     Hypertension Mother     Suicide History Maternal Grandmother         completd suicide    Suicide History Paternal Grandfather         completed suicide    Alcohol and Other Disorders Associated Sister     Substance Abuse Sister     Depression Sister     Anxiety Sister     Alcohol and Other Disorders Associated Brother     Depression Brother     Anxiety Brother     Alcohol and Other Disorders Associated Sister     Anxiety Sister     Depression Sister     Learning Disability Sister     Diabetes Father     Heart Attack Father     Heart Disease Father     Stroke Father     Cancer Paternal Grandmother         colon cancer    Breast Cancer Neg     Ovarian Cancer Neg        Social History:     Social History     Socioeconomic History    Marital status:      Spouse name: Not on file    Number of children: Not on file    Years of education: Not on file    Highest education level: Not on file   Occupational History    Not on file   Tobacco Use    Smoking status: Every Day     Current packs/day: 0.00     Types: Cigarettes     Last attempt to  quit: 1985     Years since quittin.1     Passive exposure: Current    Smokeless tobacco: Never    Tobacco comments:     Stopped , started again 2021, stopped 2022   Vaping Use    Vaping status: Never Used   Substance and Sexual Activity    Alcohol use: Not Currently     Comment: Sober since     Drug use: Never    Sexual activity: Not on file   Other Topics Concern    Not on file   Social History Narrative    Not on file     Social Drivers of Health     Financial Resource Strain: Not on file   Food Insecurity: Not on file   Transportation Needs: Not on file   Physical Activity: Not on file   Stress: Low Risk  (2021)    Received from [employer]    Stress     Stress Risk: Not on file     General Anxiety Disorder (NINO-7) Score: Not on file   Social Connections: Unknown (2023)    Received from [employer]    Social Network     Social Network: Not on file   Housing Stability: Not on file       Review of Systems:   Review of Systems   Constitutional:  Positive for chills, fatigue and fever. Negative for activity change.   HENT:  Positive for congestion, rhinorrhea, sinus pain and sore throat. Negative for ear discharge, ear pain, hoarse voice, postnasal drip and sinus pressure.    Respiratory:  Positive for cough. Negative for chest tightness, shortness of breath and wheezing.    Cardiovascular:  Negative for chest pain and palpitations.   Gastrointestinal:  Negative for abdominal distention, abdominal pain, blood in stool, constipation, diarrhea, nausea and vomiting.   Musculoskeletal:  Negative for neck pain.   Skin:  Negative for rash.   Neurological:  Positive for headaches.        Vitals:    24 1131   BP: 119/84   Pulse: 105   Temp: 98.3 °F (36.8 °C)   Weight: 139 lb (63 kg)   Height: 4' 11\" (1.499 m)     Body mass index is 28.07 kg/m².    Physical Exam:   Physical Exam  Vitals reviewed.   Constitutional:       General: She is not  in acute distress.     Appearance: She is well-developed.   HENT:      Head: Normocephalic and atraumatic.      Right Ear: Tympanic membrane, ear canal and external ear normal. There is no impacted cerumen.      Left Ear: Tympanic membrane, ear canal and external ear normal. There is no impacted cerumen.      Nose: Rhinorrhea present.      Right Sinus: No maxillary sinus tenderness or frontal sinus tenderness.      Left Sinus: No maxillary sinus tenderness or frontal sinus tenderness.      Mouth/Throat:      Mouth: Mucous membranes are moist.      Pharynx: Oropharynx is clear. Posterior oropharyngeal erythema present. No oropharyngeal exudate.   Eyes:      General:         Right eye: No discharge.         Left eye: No discharge.      Conjunctiva/sclera: Conjunctivae normal.   Cardiovascular:      Rate and Rhythm: Normal rate and regular rhythm.      Heart sounds: Normal heart sounds, S1 normal and S2 normal. No murmur heard.  Pulmonary:      Effort: Pulmonary effort is normal.      Breath sounds: Normal breath sounds. No wheezing or rales.   Chest:      Chest wall: No tenderness.   Lymphadenopathy:      Cervical: Cervical adenopathy present.   Skin:     Findings: No rash.   Neurological:      Mental Status: She is alert and oriented to person, place, and time.   Psychiatric:         Behavior: Behavior is cooperative.          Assessment and Plan::     Problem List Items Addressed This Visit    None  Visit Diagnoses       Strep pharyngitis    -  Primary    Relevant Medications    amoxicillin 500 MG Oral Cap  Supportive care includes:    encourage fluid intake   Advise patient to take Tylenol/Ibuprofen as needed for pain.  warm salt water gargles   humidifier       Sore throat        Relevant Orders    POC Rapid Strep [71433] (Completed)    Upper respiratory disease        Relevant Medications        oseltamivir 75 MG Oral Cap          Discussed plan of care with pt and pt is in agreement.All questions answered. Pt to  call with questions or concerns.         [1]   Allergies  Allergen Reactions    Biofreeze RASH

## 2025-01-14 ENCOUNTER — OFFICE VISIT (OUTPATIENT)
Dept: PHYSICAL MEDICINE AND REHAB | Facility: CLINIC | Age: 55
End: 2025-01-14
Payer: OTHER MISCELLANEOUS

## 2025-01-14 VITALS — WEIGHT: 139 LBS | HEIGHT: 59 IN | BODY MASS INDEX: 28.02 KG/M2

## 2025-01-14 DIAGNOSIS — M79.18 MYOFASCIAL PAIN: Primary | ICD-10-CM

## 2025-01-14 PROCEDURE — 99214 OFFICE O/P EST MOD 30 MIN: CPT | Performed by: PHYSICAL MEDICINE & REHABILITATION

## 2025-01-14 NOTE — PROGRESS NOTES
Progress note    C/C:   Chief Complaint   Patient presents with    Follow - Up     LOV 11/21/24 for Trigger point injections. Pt claims 50% improvement for injections 2 weeks after injections. Improvement has weakened now. Pain 4/10 in L neck. Admits N/T in L neck. Admits weakness/stiffness in neck. No pain medications. Pt is in cupping therapy with improvement.       HPI: 54 year old female presents for follow up. Started physical therapy with a physical therapist who has been incorporating cupping. Was recommended dry needling by her current physical therapist, though there is some hesitation due to severe pain from dry needling from another physical therapist. Has been doing HEP, applying pressure to the painful areas of her neck. Had an DON.  The opinion of the DON was that patient should be at [facility] upon completion of physical therapy and that she could work full duty without restrictions. Patient was unsatisfied with the DON; states that there were many discrepancies within the report. She recently went back to work full time. Increased left upper trapezial pain particularly in the last 2 hours of her shift.  Typically works four 10-hour shifts.    Pertinent allergies: Allergies[1]     Physical exam:  There were no vitals taken for this visit.     Cervical spine exam:    No neck rash  + ttp cervical paraspinals left. + ttp upper trapezius left  Cervical extension 50 degrees, pain. Cervical flexion 50 degrees, pain. Cervical rotation to the right 75 degrees. Cervical rotation to the left 75 degrees  Spurling's test left negative  5/5 strength in shoulder abduction, elbow flexion, elbow extension, wrist extension, finger flexion, FDI bilaterally  SILT b/l UE C5-T1 distributions  2/4 biceps, brachioradialis, triceps reflexes b/l  Thacker test negative bilaterally    Imaging: No new imaging to review    Assessment and plan  Cervical myofascial pain  S/p gastric bypass; cannot be on NSAIDs  NINO and PTSD    Recommend  she continue with her scheduled physical therapy, repeat left cervical paraspinal and upper trapezial trigger point injections. She may continue to work full duty, but I recommend that she work no more than 8 hours at a time. In th emeantime she may trial diclofenac gel BID x2 weeks.     Follow up for injection.     Nain Malcolm DO  Physical Medicine and Rehabilitation  [facility]       [1]   Allergies  Allergen Reactions    Biofreeze RASH

## 2025-01-16 ENCOUNTER — TELEPHONE (OUTPATIENT)
Dept: PHYSICAL MEDICINE AND REHAB | Facility: CLINIC | Age: 55
End: 2025-01-16

## 2025-01-16 ENCOUNTER — MED REC SCAN ONLY (OUTPATIENT)
Dept: PHYSICAL MEDICINE AND REHAB | Facility: CLINIC | Age: 55
End: 2025-01-16

## 2025-01-16 NOTE — TELEPHONE ENCOUNTER
Received fax from Shavon Omalley ([facility]) containing DON report. No other treatment can be authorized under this claim. Scanned into Docs on 1/16/25.

## 2025-01-30 ENCOUNTER — TELEPHONE (OUTPATIENT)
Dept: PHYSICAL MEDICINE AND REHAB | Facility: CLINIC | Age: 55
End: 2025-01-30

## 2025-01-30 NOTE — TELEPHONE ENCOUNTER
No-show letter sent on 01/30/2025 for appt missed on 01/30/2025. Sent via [employer].

## 2025-03-14 ENCOUNTER — OFFICE VISIT (OUTPATIENT)
Dept: FAMILY MEDICINE CLINIC | Facility: CLINIC | Age: 55
End: 2025-03-14
Payer: COMMERCIAL

## 2025-03-14 VITALS
SYSTOLIC BLOOD PRESSURE: 135 MMHG | BODY MASS INDEX: 30.24 KG/M2 | WEIGHT: 150 LBS | DIASTOLIC BLOOD PRESSURE: 81 MMHG | HEART RATE: 92 BPM | HEIGHT: 59 IN

## 2025-03-14 DIAGNOSIS — R05.1 ACUTE COUGH: ICD-10-CM

## 2025-03-14 DIAGNOSIS — J02.0 STREP PHARYNGITIS: Primary | ICD-10-CM

## 2025-03-14 DIAGNOSIS — J02.9 SORE THROAT: ICD-10-CM

## 2025-03-14 PROCEDURE — 99213 OFFICE O/P EST LOW 20 MIN: CPT | Performed by: PHYSICIAN ASSISTANT

## 2025-03-14 PROCEDURE — 3008F BODY MASS INDEX DOCD: CPT | Performed by: PHYSICIAN ASSISTANT

## 2025-03-14 PROCEDURE — 87880 STREP A ASSAY W/OPTIC: CPT | Performed by: PHYSICIAN ASSISTANT

## 2025-03-14 PROCEDURE — 3075F SYST BP GE 130 - 139MM HG: CPT | Performed by: PHYSICIAN ASSISTANT

## 2025-03-14 PROCEDURE — 3079F DIAST BP 80-89 MM HG: CPT | Performed by: PHYSICIAN ASSISTANT

## 2025-03-14 RX ORDER — BENZONATATE 200 MG/1
200 CAPSULE ORAL 3 TIMES DAILY PRN
Qty: 30 CAPSULE | Refills: 0 | Status: SHIPPED | OUTPATIENT
Start: 2025-03-14

## 2025-03-14 NOTE — PROGRESS NOTES
HPI:     Sore Throat   This is a new problem. Episode onset: 2 days. The problem has been unchanged. There has been no fever. Associated symptoms include coughing and headaches. Pertinent negatives include no abdominal pain, congestion, diarrhea, ear discharge, ear pain, shortness of breath, swollen glands or vomiting.         Medications:     Current Outpatient Medications   Medication Sig Dispense Refill    Tirzepatide 10 MG/0.5ML Subcutaneous Solution Auto-injector       benzonatate 200 MG Oral Cap Take 1 capsule (200 mg total) by mouth 3 (three) times daily as needed for cough. 30 capsule 0    amoxicillin clavulanate 875-125 MG Oral Tab Take 1 tablet by mouth 2 (two) times daily for 10 days. 20 tablet 0    sertraline 100 MG Oral Tab Take 2 tablets (200 mg total) by mouth every morning. 180 tablet 0    hydroCHLOROthiazide 25 MG Oral Tab Take 1 tablet (25 mg total) by mouth daily. 90 tablet 3    losartan 50 MG Oral Tab Take 1 tablet (50 mg total) by mouth daily. 90 tablet 3    metoprolol succinate ER 25 MG Oral Tablet 24 Hr Take 1 tablet (25 mg total) by mouth daily. 90 tablet 3    NON FORMULARY Take 1 tablet by mouth daily. [employer] brand of Zyrtec         Allergies:   Allergies[1]    History:     Health Maintenance   Topic Date Due    COVID-19 Vaccine (3 - 2024-25 season) 09/01/2024    Influenza Vaccine (1) 10/01/2024    Annual Depression Screening  01/01/2025    Tobacco Cessation Counseling  Never done    Annual Physical  08/22/2025    Mammogram  11/02/2025    Pap Smear  02/14/2027    DTaP,Tdap,and Td Vaccines (3 - Td or Tdap) 08/17/2030    Colorectal Cancer Screening  04/30/2032    Pneumococcal Vaccine: 50+ Years  Completed    Zoster Vaccines  Completed    Meningococcal B Vaccine  Aged Out       No LMP recorded. Patient is postmenopausal.   Past Medical History:     Past Medical History:    Anxiety    Bleeding ulcer    Depression    Dumping syndrome    Essential hypertension    H/O gastric bypass    for weight  loss    H/O laparoscopic adjustable gastric banding    removed     History of blood transfusion    Hyperlipidemia    Hypertension    Obesity (BMI 30-39.9)    KRISTY on CPAP    PONV (postoperative nausea and vomiting)    Screen for colon cancer    repeat CLN in     Sleep apnea    cpap       Past Surgical History:     Past Surgical History:   Procedure Laterality Date    Adjustment gastric band       delivery only      Cholecystectomy      Colonoscopy      Colonoscopy N/A 2022    Procedure: COLONOSCOPY;  Surgeon: ALVINA Goldstein MD;  Location: [facility] ENDOSCOPY    Gastric bypass,obesity,sb reconstruc      Hernia surgery      Lap gastric bypass/yue-en-y  2003    [place] surgical weight 225    Reduction left Left 1986    Reduction of large breast Bilateral     Reduction right Right     Removal of ovary(s) Right        Family History:     Family History   Problem Relation Age of Onset    PTSD Mother     Hypertension Mother     Suicide History Maternal Grandmother         completd suicide    Suicide History Paternal Grandfather         completed suicide    Alcohol and Other Disorders Associated Sister     Substance Abuse Sister     Depression Sister     Anxiety Sister     Alcohol and Other Disorders Associated Brother     Depression Brother     Anxiety Brother     Alcohol and Other Disorders Associated Sister     Anxiety Sister     Depression Sister     Learning Disability Sister     Diabetes Father     Heart Attack Father     Heart Disease Father     Stroke Father     Cancer Paternal Grandmother         colon cancer    Breast Cancer Neg     Ovarian Cancer Neg        Social History:     Social History     Socioeconomic History    Marital status:      Spouse name: Not on file    Number of children: Not on file    Years of education: Not on file    Highest education level: Not on file   Occupational History    Not on file   Tobacco Use    Smoking status: Every Day     Current packs/day: 0.00      Types: Cigarettes     Last attempt to quit: 1985     Years since quittin.3     Passive exposure: Current    Smokeless tobacco: Never    Tobacco comments:     Stopped , started again 2021, stopped 2022   Vaping Use    Vaping status: Never Used   Substance and Sexual Activity    Alcohol use: Not Currently     Comment: Sober since     Drug use: Never    Sexual activity: Not on file   Other Topics Concern    Not on file   Social History Narrative    Not on file     Social Drivers of Health     Food Insecurity: Not on file   Transportation Needs: Not on file   Stress: Low Risk  (2021)    [facility]    Stress     Stress Risk: Not on file     General Anxiety Disorder (NINO-7) Score: Not on file   Housing Stability: Not on file       Review of Systems:   Review of Systems   HENT:  Positive for sore throat. Negative for congestion, ear discharge and ear pain.    Respiratory:  Positive for cough. Negative for shortness of breath.    Gastrointestinal:  Negative for abdominal pain, diarrhea and vomiting.   Neurological:  Positive for headaches.        Vitals:    25 1347   BP: 135/81   Pulse: 92   Weight: 150 lb (68 kg)   Height: 4' 11\" (1.499 m)     Body mass index is 30.3 kg/m².    Physical Exam:   Physical Exam  Vitals reviewed.   Constitutional:       General: She is not in acute distress.     Appearance: She is well-developed.   HENT:      Right Ear: Tympanic membrane, ear canal and external ear normal. There is no impacted cerumen.      Left Ear: Tympanic membrane, ear canal and external ear normal. There is no impacted cerumen.      Nose: Nose normal.      Mouth/Throat:      Mouth: Mucous membranes are moist.      Pharynx: Oropharynx is clear. Posterior oropharyngeal erythema present. No oropharyngeal exudate.   Eyes:      General:         Right eye: No discharge.         Left eye: No discharge.      Conjunctiva/sclera: Conjunctivae normal.    Cardiovascular:      Rate and Rhythm: Normal rate and regular rhythm.      Heart sounds: Normal heart sounds, S1 normal and S2 normal. No murmur heard.  Pulmonary:      Effort: Pulmonary effort is normal.      Breath sounds: Normal breath sounds. No wheezing or rales.   Chest:      Chest wall: No tenderness.   Lymphadenopathy:      Cervical: Cervical adenopathy present.   Skin:     Findings: No rash.   Neurological:      Mental Status: She is alert and oriented to person, place, and time.   Psychiatric:         Behavior: Behavior is cooperative.          Assessment and Plan::     Assessment & Plan  Sore throat    Orders:    POC Rapid Strep [02259]    Strep pharyngitis    Orders:    amoxicillin clavulanate 875-125 MG Oral Tab; Take 1 tablet by mouth 2 (two) times daily for 10 days.    Supportive care includes:    encourage fluid intake   Advise patient to take Tylenol/Ibuprofen as needed for pain.  warm salt water gargles   humidifier     Acute cough    Orders:    benzonatate 200 MG Oral Cap; Take 1 capsule (200 mg total) by mouth 3 (three) times daily as needed for cough.       Discussed plan of care with pt and pt is in agreement.All questions answered. Pt to call with questions or concerns.         [1]   Allergies  Allergen Reactions    Biofreeze RASH

## 2025-08-20 DIAGNOSIS — I10 ESSENTIAL HYPERTENSION: ICD-10-CM

## 2025-08-21 ENCOUNTER — APPOINTMENT (OUTPATIENT)
Dept: CT IMAGING | Facility: HOSPITAL | Age: 55
End: 2025-08-21
Attending: EMERGENCY MEDICINE

## 2025-08-21 ENCOUNTER — HOSPITAL ENCOUNTER (EMERGENCY)
Facility: HOSPITAL | Age: 55
Discharge: HOME | End: 2025-08-21
Attending: EMERGENCY MEDICINE

## 2025-08-21 VITALS
SYSTOLIC BLOOD PRESSURE: 91 MMHG | WEIGHT: 134 LBS | BODY MASS INDEX: 27 KG/M2 | TEMPERATURE: 102 F | HEART RATE: 109 BPM | DIASTOLIC BLOOD PRESSURE: 55 MMHG | OXYGEN SATURATION: 94 % | RESPIRATION RATE: 22 BRPM

## 2025-08-21 DIAGNOSIS — N12 PYELONEPHRITIS: Primary | ICD-10-CM

## 2025-08-21 LAB
ALBUMIN SERPL-MCNC: 5.1 G/DL (ref 3.2–4.8)
ALBUMIN/GLOB SERPL: 1.8 (ref 1–2)
ALP LIVER SERPL-CCNC: 106 U/L (ref 46–118)
ALT SERPL-CCNC: 19 U/L (ref 10–49)
ANION GAP SERPL CALC-SCNC: 12 MMOL/L (ref 0–18)
AST SERPL-CCNC: 23 U/L (ref ?–34)
BASOPHILS # BLD AUTO: 0.04 X10(3) UL (ref 0–0.2)
BASOPHILS NFR BLD AUTO: 0.3 %
BILIRUB SERPL-MCNC: 0.7 MG/DL (ref 0.3–1.2)
BILIRUB UR QL STRIP.AUTO: NEGATIVE
BUN BLD-MCNC: 25 MG/DL (ref 9–23)
CALCIUM BLD-MCNC: 10.3 MG/DL (ref 8.7–10.6)
CHLORIDE SERPL-SCNC: 101 MMOL/L (ref 98–112)
CO2 SERPL-SCNC: 26 MMOL/L (ref 21–32)
CREAT BLD-MCNC: 0.91 MG/DL (ref 0.55–1.02)
EGFRCR SERPLBLD CKD-EPI 2021: 74 ML/MIN/1.73M2 (ref 60–?)
EOSINOPHIL # BLD AUTO: 0.03 X10(3) UL (ref 0–0.7)
EOSINOPHIL NFR BLD AUTO: 0.2 %
ERYTHROCYTE [DISTWIDTH] IN BLOOD BY AUTOMATED COUNT: 12.8 %
GLOBULIN PLAS-MCNC: 2.8 G/DL (ref 2–3.5)
GLUCOSE BLD-MCNC: 111 MG/DL (ref 70–99)
GLUCOSE UR STRIP.AUTO-MCNC: NORMAL MG/DL
HCT VFR BLD AUTO: 44.4 % (ref 35–48)
HGB BLD-MCNC: 15.6 G/DL (ref 12–16)
IMM GRANULOCYTES # BLD AUTO: 0.05 X10(3) UL (ref 0–1)
IMM GRANULOCYTES NFR BLD: 0.4 %
KETONES UR STRIP.AUTO-MCNC: NEGATIVE MG/DL
LEUKOCYTE ESTERASE UR QL STRIP.AUTO: 500
LYMPHOCYTES # BLD AUTO: 0.84 X10(3) UL (ref 1–4)
LYMPHOCYTES NFR BLD AUTO: 6.7 %
MCH RBC QN AUTO: 29.6 PG (ref 26–34)
MCHC RBC AUTO-ENTMCNC: 35.1 G/DL (ref 31–37)
MCV RBC AUTO: 84.3 FL (ref 80–100)
MONOCYTES # BLD AUTO: 0.2 X10(3) UL (ref 0.1–1)
MONOCYTES NFR BLD AUTO: 1.6 %
NEUTROPHILS # BLD AUTO: 11.36 X10 (3) UL (ref 1.5–7.7)
NEUTROPHILS # BLD AUTO: 11.36 X10(3) UL (ref 1.5–7.7)
NEUTROPHILS NFR BLD AUTO: 90.8 %
NITRITE UR QL STRIP.AUTO: NEGATIVE
OSMOLALITY SERPL CALC.SUM OF ELEC: 293 MOSM/KG (ref 275–295)
PH UR STRIP.AUTO: 6 (ref 5–8)
PLATELET # BLD AUTO: 313 10(3)UL (ref 150–450)
POTASSIUM SERPL-SCNC: 3.7 MMOL/L (ref 3.5–5.1)
PROT SERPL-MCNC: 7.9 G/DL (ref 5.7–8.2)
PROT UR STRIP.AUTO-MCNC: 70 MG/DL
RBC # BLD AUTO: 5.27 X10(6)UL (ref 3.8–5.3)
SODIUM SERPL-SCNC: 139 MMOL/L (ref 136–145)
SP GR UR STRIP.AUTO: 1.02 (ref 1–1.03)
TROPONIN I SERPL HS-MCNC: <3 NG/L (ref ?–34)
UROBILINOGEN UR STRIP.AUTO-MCNC: NORMAL MG/DL
WBC # BLD AUTO: 12.5 X10(3) UL (ref 4–11)
WBC #/AREA URNS AUTO: >50 /HPF
WBC CLUMPS UR QL AUTO: PRESENT /HPF

## 2025-08-21 PROCEDURE — 87077 CULTURE AEROBIC IDENTIFY: CPT | Performed by: EMERGENCY MEDICINE

## 2025-08-21 PROCEDURE — 85025 COMPLETE CBC W/AUTO DIFF WBC: CPT | Performed by: EMERGENCY MEDICINE

## 2025-08-21 PROCEDURE — 93005 ELECTROCARDIOGRAM TRACING: CPT

## 2025-08-21 PROCEDURE — 93010 ELECTROCARDIOGRAM REPORT: CPT

## 2025-08-21 PROCEDURE — 87186 SC STD MICRODIL/AGAR DIL: CPT | Performed by: EMERGENCY MEDICINE

## 2025-08-21 PROCEDURE — 84484 ASSAY OF TROPONIN QUANT: CPT | Performed by: EMERGENCY MEDICINE

## 2025-08-21 PROCEDURE — 96375 TX/PRO/DX INJ NEW DRUG ADDON: CPT

## 2025-08-21 PROCEDURE — 87086 URINE CULTURE/COLONY COUNT: CPT | Performed by: EMERGENCY MEDICINE

## 2025-08-21 PROCEDURE — 80053 COMPREHEN METABOLIC PANEL: CPT

## 2025-08-21 PROCEDURE — 81001 URINALYSIS AUTO W/SCOPE: CPT | Performed by: EMERGENCY MEDICINE

## 2025-08-21 PROCEDURE — 99284 EMERGENCY DEPT VISIT MOD MDM: CPT

## 2025-08-21 PROCEDURE — 74176 CT ABD & PELVIS W/O CONTRAST: CPT | Performed by: EMERGENCY MEDICINE

## 2025-08-21 PROCEDURE — 96365 THER/PROPH/DIAG IV INF INIT: CPT

## 2025-08-21 PROCEDURE — 81001 URINALYSIS AUTO W/SCOPE: CPT

## 2025-08-21 PROCEDURE — 80053 COMPREHEN METABOLIC PANEL: CPT | Performed by: EMERGENCY MEDICINE

## 2025-08-21 PROCEDURE — 85025 COMPLETE CBC W/AUTO DIFF WBC: CPT

## 2025-08-21 RX ORDER — KETOROLAC TROMETHAMINE 10 MG/1
10 TABLET, FILM COATED ORAL EVERY 6 HOURS PRN
Qty: 20 TABLET | Refills: 0 | Status: SHIPPED | OUTPATIENT
Start: 2025-08-21 | End: 2025-08-28

## 2025-08-21 RX ORDER — KETOROLAC TROMETHAMINE 15 MG/ML
15 INJECTION, SOLUTION INTRAMUSCULAR; INTRAVENOUS ONCE
Status: COMPLETED | OUTPATIENT
Start: 2025-08-21 | End: 2025-08-21

## 2025-08-21 RX ORDER — SULFAMETHOXAZOLE AND TRIMETHOPRIM 800; 160 MG/1; MG/1
1 TABLET ORAL 2 TIMES DAILY
Qty: 14 TABLET | Refills: 0 | Status: SHIPPED | OUTPATIENT
Start: 2025-08-21 | End: 2025-08-28

## 2025-08-21 RX ORDER — ONDANSETRON 2 MG/ML
4 INJECTION INTRAMUSCULAR; INTRAVENOUS ONCE
Status: COMPLETED | OUTPATIENT
Start: 2025-08-21 | End: 2025-08-21

## 2025-08-21 RX ORDER — ONDANSETRON 4 MG/1
4 TABLET, ORALLY DISINTEGRATING ORAL EVERY 6 HOURS PRN
Qty: 15 TABLET | Refills: 0 | Status: SHIPPED | OUTPATIENT
Start: 2025-08-21 | End: 2025-08-28

## 2025-08-21 RX ORDER — ACETAMINOPHEN 500 MG
1000 TABLET ORAL ONCE
Status: COMPLETED | OUTPATIENT
Start: 2025-08-21 | End: 2025-08-21

## 2025-08-21 RX ORDER — GABAPENTIN 100 MG/1
100 CAPSULE ORAL 3 TIMES DAILY
COMMUNITY

## 2025-08-22 LAB
ATRIAL RATE: 123 BPM
P AXIS: 23 DEGREES
P-R INTERVAL: 158 MS
Q-T INTERVAL: 300 MS
QRS DURATION: 84 MS
QTC CALCULATION (BEZET): 429 MS
R AXIS: -10 DEGREES
T AXIS: 89 DEGREES
VENTRICULAR RATE: 123 BPM

## 2025-08-22 RX ORDER — HYDROCHLOROTHIAZIDE 25 MG/1
25 TABLET ORAL DAILY
Qty: 90 TABLET | Refills: 3 | Status: SHIPPED | OUTPATIENT
Start: 2025-08-22

## 2025-08-22 RX ORDER — METOPROLOL SUCCINATE 25 MG/1
25 TABLET, EXTENDED RELEASE ORAL DAILY
Qty: 90 TABLET | Refills: 3 | Status: SHIPPED | OUTPATIENT
Start: 2025-08-22

## 2025-08-22 RX ORDER — LOSARTAN POTASSIUM 50 MG/1
50 TABLET ORAL DAILY
Qty: 90 TABLET | Refills: 3 | Status: SHIPPED | OUTPATIENT
Start: 2025-08-22

## (undated) DIAGNOSIS — Z83.79 FAMILY HISTORY OF CROHN'S DISEASE: Primary | ICD-10-CM

## (undated) DIAGNOSIS — K62.5 BRIGHT RED BLOOD PER RECTUM: ICD-10-CM

## (undated) DEVICE — 35 ML SYRINGE REGULAR TIP: Brand: MONOJECT

## (undated) DEVICE — LINE MNTR ADLT SET O2 INTMD

## (undated) DEVICE — MASK PROC W/VISOR ANTIGLARE

## (undated) DEVICE — MEDI-VAC NON-CONDUCTIVE SUCTION TUBING 6MM X 1.8M (6FT.) L: Brand: CARDINAL HEALTH

## (undated) DEVICE — KIT CLEAN ENDOKIT 1.1OZ GOWNX2

## (undated) DEVICE — KIT ENDO ORCAPOD 160/180/190

## (undated) DEVICE — FORCEP RADIAL JAW 4

## (undated) NOTE — LETTER
24    Marjan Castro  :  10/3/1970      To Whom It May Concern:    This patient was seen in our office on 24.  Work status: She should remain off work, and may return to work on light duty status starting 24 - no lifting, pushing, pulling greater than 10 pounds.     If this office may be of further assistance, please do not hesitate to contact us.      Sincerely,        Nain Malcolm, DO

## (undated) NOTE — LETTER
3/14/2025          To Whom It May Concern:    Marjan Castro is currently under my medical care and may not return to work at this time.    She may return to work on 3/17/2025..  Activity is restricted as follows: none.    If you require additional information please contact our office.        Sincerely,    Dorina Ramirez PA-C

## (undated) NOTE — LETTER
Date & Time: 3/5/2023, 2:35 PM  Patient: Jessica Smallwood  Encounter Provider(s):    Olya Romero MD       To Whom It May Concern:    Homero Michael was seen and treated in our department on 3/5/2023. She can return to work on Tuesday 03/07/2023.     If you have any questions or concerns, please do not hesitate to call.        _____________________________  Physician/APC Signature

## (undated) NOTE — LETTER
[facility]  [address]  [place] [address]  PH: [phone]  FAX: [phone]        Patient Information:  Patient Name:   Address: Marjan Castro, ([id number])  [address]  [place] [address]  [phone] (home)  Sex: female          : 10/3/1970   ________________________________________________________________  Referral Information:  Order Date: Oct 7, 2024       Epic Order #: [id number]   Referral Type: PHYSICAL THERAPY EXTERNAL Dx: Myofascial pain (M79.18)  Acute bilateral low back pain without sciatica (M54.50)   Signed Referral [place]:        Comments: Comments: Evaluate and Treat  Dx: myofascial pain; acute low back pain without radicular features. Date of injury: 24  Duration: 1-2x/week for 6-8 weeks, 12-16 sessions  Soft tissue/MFR  Deep Tissue Massage  Cupping  Neutral to flexion lumbar stabilization  HEP  Number of Visits: 16  Number of Visits: 16           ______________________________________________________________  Scheduling Information:           **REFERRAL REQUEST**     Your physician has referred you to a specialist.  Your physician or the clinic staff will provide you with the phone number you should call to schedule your appointment.      If you are confident that your benefit plan will not require a referral or authorization, such as [place] Medicaid, please feel free to schedule your appointment immediately. However, if you are unsure about the requirements for authorization, please wait 5-7 days and then contact your physician's   office. At that time, you will be provided with any authorization numbers or be assured that none are required. You can then schedule your appointment. Failure to obtain required authorization numbers can create reimbursement difficulties for you.  _______________________________________________________        Coverage Information:      Active Insurance as of 10/3/2024               Primary Coverage               Payor Plan Insurance Group Employer/Plan Group      [place] [id number]                Payor Plan Address Payor Plan Phone Number Payor Plan Fax Number Effective Dates      [address]     7/1/2024 - None Entered      [facility] [address]                    Subscriber Name Subscriber Birth Date Member ID           AJ HAMEED 10/3/1970 [id number]                Guarantor Name (ID) Guarantor Birth Date Guarantor Address Guarantor Type      AJ HAMEED ([id number]) 10/3/1970 [address]          [address] [address]                       Secondary Coverage               Payor Plan Insurance Group Employer/Plan Group      WORKERS COMP WORKERS COMP [id number]                Coverage Address Coverage Phone Number Coverage Fax Number Effective Dates      [address] [place] [phone] 7/1/2024 - None Entered      [facility] [address]                    Subscriber Name Subscriber Birth Date Member ID           [id number] 10/3/1970 [id number]                Guarantor Name (ID) Guarantor Birth Date Guarantor Address Guarantor Type      [id number] ([id number]) 10/3/1970 [address] [employer]          [facility] [address]                    Electronically Signed By: Nain Malcolm DO [NPI: [id number]]  Order Date: Oct 7, 2024 at 4:11 PM

## (undated) NOTE — LETTER
10/22/24    Marjan Castro  :  10/3/1970    To Whom It May Concern:    This patient was seen in our office on 10/22/24.  Work status: Recommend remain off work for the rest of the week; afterwards may resume light duty restrictions on Monday, 10/28/24 - no lifting, pushing, pulling greater than 10 pounds.     If this office may be of further assistance, please do not hesitate to contact us.      Sincerely,        Nain Malcolm, DO

## (undated) NOTE — MR AVS SNAPSHOT
After Visit Summary   2/14/2024    Marjan Castro   MRN: [id number]           Visit Information     Date & Time  2/14/2024  3:20 PM Provider  Saturnino Espana DO [facility] - OB/GYN Dept. Phone  [phone]      Your Vitals Were  Most recent update: 2/14/2024  3:32 PM    BP   102/68    Pulse   77    Wt   134 lb 12.8 oz    BMI   27.32 kg/m²          Allergies as of 2/14/2024  Review status set to Review Complete on 2/14/2024   No Known Allergies     Your Current Medications        Dosage    semaglutide-weight management (WEGOVY) 2.4 MG/0.75ML Subcutaneous Solution Auto-injector Inject 0.75 mL (2.4 mg total) into the skin once a week.    Fezolinetant (VEOZAH) 45 MG Oral Tab Take 45 mg by mouth daily.    sertraline (ZOLOFT) 100 MG Oral Tab Take 2 tablets (200 mg total) by mouth every morning.    cyclobenzaprine 5 MG Oral Tab Take 1 tablet (5 mg total) by mouth nightly.    losartan 50 MG Oral Tab Take 1 tablet (50 mg total) by mouth daily.    hydroCHLOROthiazide 25 MG Oral Tab Take 1 tablet (25 mg total) by mouth daily.    metoprolol succinate ER 25 MG Oral Tablet 24 Hr Take 1 tablet (25 mg total) by mouth daily.    cetirizine 10 MG Oral Tab Take 1 tablet (10 mg total) by mouth daily.    glycopyrrolate 1 MG Oral Tab Take 1 tablet (1 mg total) by mouth 3 (three) times daily.    BUPROPION  MG Oral Tablet 12 Hr TAKE 1 TABLET BY MOUTH TWICE A DAY    HYDROXYZINE 50 MG Oral Tab TAKE 1 TABLET BY MOUTH THREE TIMES A DAY AS NEEDED    azelastine 0.1 % Nasal Solution 2 sprays by Nasal route 2 (two) times daily.    predniSONE 10 MG Oral Tab Take 1 tablet (10 mg total) by mouth in the morning, at noon, and at bedtime.      Diagnoses for This Visit    Dysplasia of cervix, high grade TRISTON 2   [6976455]  -  Primary  Screening for malignant neoplasm of cervix   [200253]             We Ordered the Following     Normal Orders This Visit    COLPOSCOPY, CERVIX  W/UPPER ADJACENT VAGINA; W/ENDOCERVICAL CURETTAGE (CPT=57456) [03570 CPT(R)]     Hpv Dna  High Risk , Thin Prep Collect [[id number] CUSTOM]     Specimen to Pathology Tissue [[id number] CUSTOM]     THINPREP PAP SMEAR ONLY [[id number] CUSTOM]     ThinPrep PAP Smear [[id number] CUSTOM]     Future Labs/Procedures Expected by Expires    Hpv Dna  High Risk , Thin Prep Collect [[id number] CUSTOM]  2/14/2024 2/14/2025    Specimen to Pathology Tissue [[id number] CUSTOM]  2/14/2024 2/13/2025    ThinPrep PAP Smear [[id number] CUSTOM]  2/14/2024 2/14/2025      Future Appointments        Provider Department    7/3/2024 3:30 PM Arm[facility]                Did you know that [facility] primary care physicians now offer Video Visits through [employer] for adult patients for a variety of conditions such as allergies, back pain and cold symptoms? Skip the drive and waiting room and online chat with a doctor face-to-face using your web-cam enabled computer or mobile device wherever you are. Video Visits cost $50 and can be paid hassle-free using a credit, debit, or health savings card.  Not active on [employer]? Ask us how to get signed up today!          If you receive a survey from [employer], please take a few minutes to complete it and provide feedback. We strive to deliver the best patient experience and are looking for ways to make improvements. Your feedback will help us do so. For more information on [employer], please visit [email or web address]           No text in SmartText           No text in SmartText

## (undated) NOTE — LETTER
Date: 2024      Patient Name: Marjan Castro      : 10/3/1970        Thank you for choosing [facility] as your health care provider. Your physician has deemed the following medical service(s) necessary. However, your insurance plan may not pay for all of your health care and costs and may deny payment for this service. The fact that your insurance plan does not pay for an item or service does not mean you should not receive it. The purpose of this form is to help you make an informed decision about whether or not you want to receive this service(s) that may not be paid for by your insurance plan.    CPT Code Description     Cost     Left cervical paraspinal and upper trapezius trigger point injections       I understand that the above mentioned service(s) or supply may not be covered by my insurance company. I agree to be financially responsible for the cost of this service or supply in the event of my insurance denies payment as a non-covered benefit.        ______________________________________________________________________  Signature of Patient or Patient's Representative  Relationship  Date    ______________________________________________________________________  Signature of Witness to signing of form   Printed Name

## (undated) NOTE — LETTER
WHERE IS YOUR PAIN NOW?  Leann the areas on your body where you feel the described sensations.  Use the appropriate symbol.  Leann the areas of radiation.  Include all affected areas.  Just to complete the picture, please draw in the face.     ACHE:  ^ ^ ^   NUMBNESS:  0000   PINS & NEEDLES:  = = = =                              ^ ^ ^                       0000              = = = =                                    ^ ^ ^                       0000            = = = =      BURNING:  XXXX   STABBING: ////                  XXXX                ////                         XXXX          ////     Please leann the line below indicating your degree of pain right now  with 0 being no pain 10 being the worst pain possible.                                         0             1             2              3             4              5              6              7             8             9             10         Patient Signature:

## (undated) NOTE — LETTER
25          Marjan Castro  :  10/3/1970      To Whom It May Concern:    This patient was seen in our office on 25.  Work status:  Recommend she work full duty with work hour restrictions - should work no more than 8 hour shifts. She has no lifting restrictions. Should continue physical therapy, and I also recommend left cervical paraspinal and upper trapezial trigger point injections. She should follow up 2 weeks afterwards.     If this office may be of further assistance, please do not hesitate to contact us.      Sincerely,        Nain Malcolm DO

## (undated) NOTE — LETTER
12/15/2022          To Whom It May Concern:    Hallie Quintanilla is currently under my medical care and may not return to work at this time. She may return to work on 12/19/2022. Activity is restricted as follows: none. If you require additional information please contact our office.         Sincerely,    Colt Bishop PA-C          Document generated by:  Colt Bishop PA-C

## (undated) NOTE — LETTER
09/10/24    Marjan Castro  :  10/3/1970    To Whom It May Concern:    This patient was seen in our office on 09/10/24.  Work status: No lifting, pushing, pulling greater than 10 pounds. Should be allowed to change position as needed.    If this office may be of further assistance, please do not hesitate to contact us.      Sincerely,        Nain Malcolm, DO

## (undated) NOTE — LETTER
AUTHORIZATION FOR SURGICAL OPERATION OR OTHER PROCEDURE    1. I hereby authorize Dr. Nain Malcolm and the [facility] staff assigned to my case to perform the following operation and/or procedure at the [facility]:    Left cervical paraspinal and upper trapezius trigger point injections     2.  My physician has explained the nature and purpose of the operation or other procedure, possible alternative methods of treatment, the risks involved, and the possibility of complication to me.  I acknowledge that no guarantee has been made as to the result that may be obtained.  3.  I recognize that, during the course of this operation, or other procedure, unforseen conditions may necessitate additional or different procedure than those listed above.  I, therefore, further authorize and request that the above named physician, his/her physician assistants or designees perform such procedures as are, in his/her professional opinion, necessary and desirable.  4.  Any tissue or organs removed in the operation or other procedure may be disposed of by and at the discretion of the [facility] staff and [facility].  5.  I understand that in the event of a medical emergency, I will be transported by local paramedics to [facility] or other hospital emergency department.  6.  I certify that I have read and fully understand the above consent to operation and/or other procedure.    7.  I acknowledge that my physician has explained sedation/analgesia administration to me including the risks and benefits.  I consent to the administration of sedation/analgesia as may be necessary or desirable in the judgement of my physician.    Witness signature: ___________________________________________________ Date:  ______/______/_____                    Time:  ________ A.M.  P.M.       Patient Name:    Marjan Castro  10/3/1970  [id number]       Patient signature:   ___________________________________________________             Statement of Physician  My signature below affirms that prior to the time of the procedure, I have explained to the patient and/or his/her guardian, the risks and benefits involved in the proposed treatment and any reasonable alternative to the proposed treatment.  I have also explained the risks and benefits involved in the refusal of the proposed treatment and have answered the patient's questions.                        Date:  ______/______/_______  Provider                      Signature:  __________________________________________________________       Time:  ___________ AVINOD OROZCO

## (undated) NOTE — Clinical Note
Kevin Romo today  Will start her on low dose Phentermine  I ordered her bariatric labs    Thanks  Dr Neelima Fulton

## (undated) NOTE — LETTER
1/30/2025    Marjan Castro  [address]  [address]  [place] [address]    Dear Marjan,    We would like to inform you that your account has been charged $40 for not showing up to the office for your scheduled appointment on 01/30/2025.    Our no-show policy is as follows: A 24-hour notice is required, or you may be charged a $40 No Show fee.      If you are unable to keep your scheduled appointment, please notify us at least 24 hours in advance so we can accommodate our other patients. You may also reschedule your appointment at that time.    On the third no-show, within a 12-month period, it will be the physician’s discretion as to whether a discharge letter will be sent out disengaging you from the practice and giving you 30 days to enroll with a new non [facility] physician.    If you would like to contest this charge, please call [phone].    Sincerely,  [facility]

## (undated) NOTE — LETTER
AUTHORIZATION FOR SURGICAL OPERATION OR OTHER PROCEDURE    1. I hereby authorize Dr. Breann Alvarez, and [place], [facility] staff assigned to my case to perform the following operation and/or procedure at the [facility], [facility]:  COLPOSCOPY  _______________________________________________________________________________________________      _______________________________________________________________________________________________    2. My physician has explained the nature and purpose of the operation or other procedure, possible alternative methods of treatment, the risks involved, and the possibility of complication to me. I acknowledge that no guarantee has been made as to the result that may be obtained. 3.  I recognize that, during the course of this operation, or other procedure, unforseen conditions may necessitate additional or different procedure than those listed above. I, therefore, further authorize and request that the above named physician, his/her physician assistants or designees perform such procedures as are, in his/her professional opinion, necessary and desirable. 4.  Any tissue or organs removed in the operation or other procedure may be disposed of by and at the discretion of the [facility], [facility] and [facility]. 5.  I understand that in the event of a medical emergency, I will be transported by local paramedics to [facility] or other [facility] emergency department. 6.  I certify that I have read and fully understand the above consent to operation and/or other procedure. 7.  I acknowledge that my physician has explained sedation/analgesia administration to me including the risks and benefits. I consent to the administration of sedation/analgesia as may be necessary or desirable in the judgement of my physician. Witness signature: ___________________________________________________ Date:  ______/______/_____                    Time:  ________ A. M. P.M.       Patient Name:  ______________________________________________________  (please print)      Patient signature:  ___________________________________________________             Relationship to Patient:           []  Parent    Responsible person                          []  Spouse  In case of minor or                    [] Other  _____________   Incompetent name:  __________________________________________________                               (please print)      _____________      Responsible person  In case of minor or  Incompetent signature:  _______________________________________________    Statement of Physician  My signature below affirms that prior to the time of the procedure, I have explained to the patient and/or his/her guardian, the risks and benefits involved in the proposed treatment and any reasonable alternative to the proposed treatment. I have also explained the risks and benefits involved in the refusal of the proposed treatment and have answered the patient's questions.                         Date:  ______/______/_______  Provider                      Signature:  __________________________________________________________       Time:  ___________ A.M    P.M.

## (undated) NOTE — LETTER
[facility]  Administration of Anesthesia  1. Brandan Eaton, or _________________________________ acting on her behalf, (Patient) (Dependent/Representative) request to receive anesthesia for my pending procedure/operation/treatment. A physician (anesthesiologist) alone or an anesthesiologist working with a nurse anesthetist may administer my anesthesia. 2. I understand that my anesthesiologist is not an employee or agent of the hospital, but is an independent medical practitioner who has been permitted to use its facilities for the care and treatment of his/her patients. 3. I acknowledge that a physician from [facility] or their designate(s), recommended anesthesia for me using her/his medical judgment. The type(s) of anesthesia I may receive include:                a) General Anesthesia, b) Spinal/Epidural Anesthesia, c) Regional Anesthesia or d) Monitored Anesthesia Care. 4. If my spinal, regional or monitored anesthesia care (local) is not satisfactory for my comfort, or if my medical condition requires, I consent to the administration of general anesthesia. 5. I am aware that the practice of anesthesiology is not an exact science and that some foreseeable risks or consequences may occur. Some common risks/consequences include sore throat and hoarseness, nausea and vomiting, muscle soreness, backache, damage to the mouth/teeth/vocal cords and eye injury. I understand that more rare but serious potential risks of anesthesia include blood pressure changes, drug reactions, cardiac arrest, brain damage, paralysis or death. These risks apply to whether I have general, spinal/epidural, regional or monitored anesthesia care. 6. OBSTETRIC PATIENTS: Specific risks/consequences of spinal/epidural anesthesia may include itching, low blood pressure, difficulty urinating, slowing of the baby's heart rate and headache.  Rare risks include infections, high spinal block, spinal bleeding, seizure, cardiac arrest and death. 7. AWARENESS: I understand that it is possible (but unlikely) to have explicit memory of events from the operating room while under general anesthesia. 8. ELECTROCONVULSIVE THERAPY PATIENTS: This consent serves for all treatments in a single course of therapy. 9. I understand that I must inform my anesthesiologist when I last ate and/or drank to minimize the risk of anesthesia. 10. If I am pregnant, or may pregnant, I understand that elective surgery should be postponed until after the baby is born. Anesthetics cross the placenta and may temporarily anesthetize the baby. Although fetal complications of anesthesia during pregnancy are rare, they may include birth defects, premature labor, brain damage and death. 11. I certify that I informed the anesthesiologist, to the best of my ability, about medication I take including blood thinners, anticoagulants, herbal remedies, narcotics and recreational drugs (e.g. cocaine, marijuana, PCP). Failure to inform my anesthesiologist about these medicines may increase my risk of anesthetic complications. The nature and purpose of my anesthetic management was explained to me. I had the opportunity to ask questions and the answers and information provided meet my satisfaction.   I retain the right to withdraw this consent at any time prior to the administration of said anesthetic.    ___________________________________________________           _____________________________________________________  Patient Signature                                                                                      Witness Signature                ___________________________________________________           _____________________________________________________  Date/Time                                                                                               Responsible person in case of minor/ unconscious pt /Relationship    My signature below affirms that prior to the time of the procedure, I have explained to the patient and/or his/her guardian, the risks and benefits of undergoing anesthesia, as well as any reasonable alternatives.     ___________________________________________________            _____________________________________________________  Physician Signature                            Date/Time  Patient Name: Viktor Garduno     : 10/3/1970     Printed: 2022      Medical Record #: [id number]                              Page 1 of 1    ----------ANESTHESIA CONSENT----------

## (undated) NOTE — LETTER
AUTHORIZATION FOR SURGICAL OPERATION OR OTHER PROCEDURE    1. I hereby [facility]. Junito Rodriguez and [facility] to my case to perform the following operation and/or procedure [facility]:    COLPOSCOPY      _______________________________________________________________________________________________    2. My physician has explained the nature and purpose of the operation or other procedure, possible alternative methods of treatment, the risks involved, and the possibility of complication to me. I acknowledge that no guarantee has been made as to the result that may be obtained. 3.  I recognize that, during the course of this operation, or other procedure, unforseen conditions may necessitate additional or different procedure than those listed above. I, therefore, further authorize and request that the above named physician, his/her physician assistants or designees perform such procedures as are, in his/her professional opinion, necessary and desirable. 4.  Any tissue or organs removed in the operation or other procedure may be disposed of by and at the discretion of the [facility], [facility] and [facility]. 5.  I understand that in the event of a medical emergency, I will be transported by local paramedics to [facility] or other hospital emergency department. 6.  I certify that I have read and fully understand the above consent to operation and/or other procedure. 7.  I acknowledge that my physician has explained sedation/analgesia administration to me including the risks and benefits. I consent to the administration of sedation/analgesia as may be necessary or desirable in the judgement of my physician. Witness signature: ___________________________________________________ Date:  ______/______/_____                    Time:  ________ A. M.  P.M.        Patient Name:  ______________________________________________________  (please print)      Patient signature:  ___________________________________________________             Relationship to Patient:           []  Parent    Responsible person                          []  Spouse  In case of minor or                    [] Other  _____________   Incompetent name:  __________________________________________________                               (please print)      _____________      Responsible person  In case of minor or  Incompetent signature:  _______________________________________________    Statement of Physician  My signature below affirms that prior to the time of the procedure, I have explained to the patient and/or his/her guardian, the risks and benefits involved in the proposed treatment and any reasonable alternative to the proposed treatment. I have also explained the risks and benefits involved in the refusal of the proposed treatment and have answered the patient's questions.                         Date:  ______/______/_______  Provider                      Signature:  __________________________________________________________       Time:  ___________ A.M    P.M.

## (undated) NOTE — LETTER
24          Marjan Castro  :  10/3/1970      To Whom It May Concern:    This patient was seen in our office on 10/29/24 .  Work status:  Remain off work,  effective from 2024 until 2024.    If this office may be of further assistance, please do not hesitate to contact us.      Sincerely,        Nain Malcolm, DO

## (undated) NOTE — LETTER
8/7/2023          To Whom It May Concern:    Eduarda Lowe is currently under my medical care and may not return to work at this time. Please excuse Jaren Baez for 1 days. She may return to work on 08/09/2023. Activity is restricted as follows: none. If you require additional information please contact our office. Sincerely,      Louann Rogers. Brandt Castanon DO          Document generated by:  Louann Rogers.  DO Jose

## (undated) NOTE — LETTER
[address], [place]  Authorization for Invasive Procedures  1. I hereby authorize Dr. Tra Truong , my physician and whomever may be designated as the doctor's assistant, to perform the following operation and/or procedure:  Colonoscopy on [place] at [facility].    2. My physician has explained to me the nature and purpose of the operation or other procedure, possible alternative methods of treatment, the risks involved and the possibility of complications to me. I understand the probable consequences of declining the recommended procedure and the alternative methods of treatment. I acknowledge that no guarantee has been made as to the result that may be obtained. 3. I recognize that during the course of this operation or other procedure, unforeseen conditions may necessitate additional or different procedures than those listed above. I, therefore, further authorize and request that the above-named physician, his/her physician assistants, or designees perform such procedures as are, in his/her professional opinion, necessary and desirable. If I have a Do Not Attempt Resuscitation (DNAR) order in place, that status will be suspended while in the operating room, procedural suite, and during the recovery period unless otherwise explicitly stated by me (or a person authorized to consent on my behalf). The surgeon or my attending physician will determine when the applicable recovery period ends for purposes of reinstating the DNAR order. 4. Should the need arise during my operation or immediate post-operative period; I also consent to the administration of blood and/or blood products.  Further, I understand that despite careful testing and screening of blood and blood products, I may still be subject to ill effects as a result of recieving a blood transfusion an/or blood producst. The following are some, but not all, of the potential risks that can occur: fever and allergic reactions, hemolytic reactions, transmission of disease such as hepatitis, AIDS, cytomegalovirus (CMV), and flluid overload. In the event that I wish to have autologous transfusions of my own blood, or a directed donor transfusion, I will discuss this with my physician. 5. I consent to the photographing of the operations or procedures to be performed for the purposes of advancing medicine, science, and/or education, provided my identity is not revealed. If the procedure has been videotaped, the physician/surgeon will obtain the original videotape. The hospital will not be responsible for storage or maintenance of this tape. 6. I consent to the presence of a  or observer as deemed necessary by my physician or his designee. 7. Any tissues or organs removed in the operation or other procedure may be disposed of by and at the discretion of [facility].    8. I understand that the physician and his/her physician assistants may not be employees or agents of [facility], [facility], [facility], but are independent medical practitioners who have been permitted to use its facilities for the care and treatment of their patients. 9. Patients having a sterilization procedure: I understand that if the procedure is successful the results will be permanent and it will therefore be impossible for me to inseminate, conceive or bear children. I also understand that the procedure is intended to result in sterility, although the result has not been guaranteed. 10. I CERTIFY THAT I HAVE READ AND FULLY UNDERSTAND THE ABOVE CONSENT TO OPERATION and/or OTHER PROCEDURE. 11. I acknowledge that my physician has explained sedation/analgesia administration to me including the risks and benefits. I consent to the administration of sedation/analgesia as may be necessary or desirable in the judgment of my physician.      Signature of Patient:  ________________________________________________ Date: _________Time: _________    Responsible person in case of minor or unconscious: _____________________________Relationship: ____________     Witness Signature: ____________________________________________ Date: __________ Time: ___________    Statement of Physician  My signature below affirms that prior to the time of the procedure, I have explained to the patient and/or her legal representative, the risks and benefits involved in the proposed treatment and any reasonable alternative to the proposed treatment. I have also explained the risks and benefits involved in the refusal of the proposed treatment and have answered the patient's questions. If I have a significant financial interest in this procedure/surgery, I have disclosed this and had a discussion with my patient.     Signature of Physician:   ________________________________________Date: _________Time:_______ Patient Name: Jessica Smallwood  : 10/3/1970   Printed: 2022    Medical Record #: [id number]

## (undated) NOTE — LETTER
AUTHORIZATION FOR SURGICAL OPERATION OR OTHER PROCEDURE    1. I hereby authorize Dr. Saturnino Mckeon, and [facility] staff assigned to my case to perform the following operation and/or procedure at the [facility]:    _______________________________________________________________________________________________  Colposcopy    _______________________________________________________________________________________________    2.  My physician has explained the nature and purpose of the operation or other procedure, possible alternative methods of treatment, the risks involved, and the possibility of complication to me.  I acknowledge that no guarantee has been made as to the result that may be obtained.  3.  I recognize that, during the course of this operation, or other procedure, unforseen conditions may necessitate additional or different procedure than those listed above.  I, therefore, further authorize and request that the above named physician, his/her physician assistants or designees perform such procedures as are, in his/her professional opinion, necessary and desirable.  4.  Any tissue or organs removed in the operation or other procedure may be disposed of by and at the discretion of the [facility] and [facility].  5.  I understand that in the event of a medical emergency, I will be transported by local paramedics to [facility] or other hospital emergency department.  6.  I certify that I have read and fully understand the above consent to operation and/or other procedure.    7.  I acknowledge that my physician has explained sedation/analgesia administration to me including the risks and benefits.  I consent to the administration of sedation/analgesia as may be necessary or desirable in the judgement of my physician.    Witness signature: ___________________________________________________ Date:  ______/______/_____                    Time:  ________  A.M.  P.M.       Patient Name:  ______________________________________________________  (please print)      Patient signature:  ___________________________________________________             Relationship to Patient:           []  Parent    Responsible person                          []  Spouse  In case of minor or                    [] Other  _____________   Incompetent name:  __________________________________________________                               (please print)      _____________      Responsible person  In case of minor or  Incompetent signature:  _______________________________________________    Statement of Physician  My signature below affirms that prior to the time of the procedure, I have explained to the patient and/or his/her guardian, the risks and benefits involved in the proposed treatment and any reasonable alternative to the proposed treatment.  I have also explained the risks and benefits involved in the refusal of the proposed treatment and have answered the patient's questions.                        Date:  ______/______/_______  Provider                      Signature:  __________________________________________________________       Time:  ___________ A.M    P.M.

## (undated) NOTE — LETTER
8/22/2024          To Whom It May Concern:    Marjan Castro is currently under my medical care and may not return to work at this time.      She may return to work on 08/26/2024.  Activity is restricted as follows: none.    If you require additional information please contact our office.        Sincerely,    Dorina Ramirez PA-C          Document generated by:  Dorina Ramirez PA-C